# Patient Record
Sex: FEMALE | Race: WHITE | HISPANIC OR LATINO | Employment: FULL TIME | URBAN - METROPOLITAN AREA
[De-identification: names, ages, dates, MRNs, and addresses within clinical notes are randomized per-mention and may not be internally consistent; named-entity substitution may affect disease eponyms.]

---

## 2017-10-13 ENCOUNTER — TRANSFERRED RECORDS (OUTPATIENT)
Dept: HEALTH INFORMATION MANAGEMENT | Facility: CLINIC | Age: 47
End: 2017-10-13

## 2017-10-27 ENCOUNTER — TRANSFERRED RECORDS (OUTPATIENT)
Dept: HEALTH INFORMATION MANAGEMENT | Facility: CLINIC | Age: 47
End: 2017-10-27

## 2017-11-01 ENCOUNTER — TRANSFERRED RECORDS (OUTPATIENT)
Dept: HEALTH INFORMATION MANAGEMENT | Facility: CLINIC | Age: 47
End: 2017-11-01

## 2017-11-10 ENCOUNTER — TRANSFERRED RECORDS (OUTPATIENT)
Dept: HEALTH INFORMATION MANAGEMENT | Facility: CLINIC | Age: 47
End: 2017-11-10

## 2017-11-15 ENCOUNTER — TRANSFERRED RECORDS (OUTPATIENT)
Dept: HEALTH INFORMATION MANAGEMENT | Facility: CLINIC | Age: 47
End: 2017-11-15

## 2017-11-17 ENCOUNTER — SURGERY (OUTPATIENT)
Age: 47
End: 2017-11-17

## 2017-11-17 ENCOUNTER — HOSPITAL ENCOUNTER (OUTPATIENT)
Facility: CLINIC | Age: 47
Discharge: HOME | End: 2017-11-17
Attending: OBSTETRICS & GYNECOLOGY | Admitting: OBSTETRICS & GYNECOLOGY
Payer: COMMERCIAL

## 2017-11-17 ENCOUNTER — ANESTHESIA EVENT (OUTPATIENT)
Dept: SURGERY | Facility: CLINIC | Age: 47
End: 2017-11-17
Payer: COMMERCIAL

## 2017-11-17 ENCOUNTER — ANESTHESIA (OUTPATIENT)
Dept: SURGERY | Facility: CLINIC | Age: 47
End: 2017-11-17
Payer: COMMERCIAL

## 2017-11-17 VITALS
OXYGEN SATURATION: 96 % | WEIGHT: 207.5 LBS | TEMPERATURE: 97.5 F | SYSTOLIC BLOOD PRESSURE: 118 MMHG | HEIGHT: 60 IN | DIASTOLIC BLOOD PRESSURE: 74 MMHG | BODY MASS INDEX: 40.74 KG/M2 | RESPIRATION RATE: 16 BRPM

## 2017-11-17 DIAGNOSIS — G89.18 ACUTE POST-OPERATIVE PAIN: Primary | ICD-10-CM

## 2017-11-17 LAB
B-HCG SERPL-ACNC: <1 IU/L (ref 0–5)
HGB BLD-MCNC: 12.2 G/DL (ref 11.7–15.7)

## 2017-11-17 PROCEDURE — 27210794 ZZH OR GENERAL SUPPLY STERILE: Performed by: OBSTETRICS & GYNECOLOGY

## 2017-11-17 PROCEDURE — 37000008 ZZH ANESTHESIA TECHNICAL FEE, 1ST 30 MIN: Performed by: OBSTETRICS & GYNECOLOGY

## 2017-11-17 PROCEDURE — 84702 CHORIONIC GONADOTROPIN TEST: CPT | Performed by: PHYSICIAN ASSISTANT

## 2017-11-17 PROCEDURE — 36000058 ZZH SURGERY LEVEL 3 EA 15 ADDTL MIN: Performed by: OBSTETRICS & GYNECOLOGY

## 2017-11-17 PROCEDURE — 37000009 ZZH ANESTHESIA TECHNICAL FEE, EACH ADDTL 15 MIN: Performed by: OBSTETRICS & GYNECOLOGY

## 2017-11-17 PROCEDURE — 71000027 ZZH RECOVERY PHASE 2 EACH 15 MINS: Performed by: OBSTETRICS & GYNECOLOGY

## 2017-11-17 PROCEDURE — 36415 COLL VENOUS BLD VENIPUNCTURE: CPT | Performed by: PHYSICIAN ASSISTANT

## 2017-11-17 PROCEDURE — 27210995 ZZH RX 272: Performed by: OBSTETRICS & GYNECOLOGY

## 2017-11-17 PROCEDURE — 71000012 ZZH RECOVERY PHASE 1 LEVEL 1 FIRST HR: Performed by: OBSTETRICS & GYNECOLOGY

## 2017-11-17 PROCEDURE — 71000013 ZZH RECOVERY PHASE 1 LEVEL 1 EA ADDTL HR: Performed by: OBSTETRICS & GYNECOLOGY

## 2017-11-17 PROCEDURE — 85018 HEMOGLOBIN: CPT | Performed by: PHYSICIAN ASSISTANT

## 2017-11-17 PROCEDURE — 25000128 H RX IP 250 OP 636: Performed by: NURSE ANESTHETIST, CERTIFIED REGISTERED

## 2017-11-17 PROCEDURE — 25800025 ZZH RX 258: Performed by: OBSTETRICS & GYNECOLOGY

## 2017-11-17 PROCEDURE — 36000056 ZZH SURGERY LEVEL 3 1ST 30 MIN: Performed by: OBSTETRICS & GYNECOLOGY

## 2017-11-17 PROCEDURE — 40000170 ZZH STATISTIC PRE-PROCEDURE ASSESSMENT II: Performed by: OBSTETRICS & GYNECOLOGY

## 2017-11-17 PROCEDURE — 88305 TISSUE EXAM BY PATHOLOGIST: CPT | Performed by: OBSTETRICS & GYNECOLOGY

## 2017-11-17 PROCEDURE — 88305 TISSUE EXAM BY PATHOLOGIST: CPT | Mod: 26 | Performed by: OBSTETRICS & GYNECOLOGY

## 2017-11-17 PROCEDURE — 25000125 ZZHC RX 250: Performed by: NURSE ANESTHETIST, CERTIFIED REGISTERED

## 2017-11-17 PROCEDURE — 25000566 ZZH SEVOFLURANE, EA 15 MIN: Performed by: OBSTETRICS & GYNECOLOGY

## 2017-11-17 RX ORDER — FENTANYL CITRATE 50 UG/ML
25-50 INJECTION, SOLUTION INTRAMUSCULAR; INTRAVENOUS EVERY 5 MIN PRN
Status: DISCONTINUED | OUTPATIENT
Start: 2017-11-17 | End: 2017-11-17 | Stop reason: HOSPADM

## 2017-11-17 RX ORDER — FENTANYL CITRATE 50 UG/ML
INJECTION, SOLUTION INTRAMUSCULAR; INTRAVENOUS PRN
Status: DISCONTINUED | OUTPATIENT
Start: 2017-11-17 | End: 2017-11-17

## 2017-11-17 RX ORDER — PHYSOSTIGMINE SALICYLATE 1 MG/ML
1.2 INJECTION INTRAVENOUS
Status: DISCONTINUED | OUTPATIENT
Start: 2017-11-17 | End: 2017-11-17 | Stop reason: HOSPADM

## 2017-11-17 RX ORDER — IBUPROFEN 600 MG/1
600 TABLET, FILM COATED ORAL
Status: DISCONTINUED | OUTPATIENT
Start: 2017-11-17 | End: 2017-11-17 | Stop reason: HOSPADM

## 2017-11-17 RX ORDER — FENTANYL CITRATE 50 UG/ML
25-50 INJECTION, SOLUTION INTRAMUSCULAR; INTRAVENOUS
Status: DISCONTINUED | OUTPATIENT
Start: 2017-11-17 | End: 2017-11-17 | Stop reason: HOSPADM

## 2017-11-17 RX ORDER — SODIUM CHLORIDE, SODIUM LACTATE, POTASSIUM CHLORIDE, CALCIUM CHLORIDE 600; 310; 30; 20 MG/100ML; MG/100ML; MG/100ML; MG/100ML
INJECTION, SOLUTION INTRAVENOUS CONTINUOUS PRN
Status: DISCONTINUED | OUTPATIENT
Start: 2017-11-17 | End: 2017-11-17

## 2017-11-17 RX ORDER — OXYCODONE HYDROCHLORIDE 5 MG/1
5 TABLET ORAL
Status: DISCONTINUED | OUTPATIENT
Start: 2017-11-17 | End: 2017-11-17 | Stop reason: HOSPADM

## 2017-11-17 RX ORDER — MAGNESIUM HYDROXIDE 1200 MG/15ML
LIQUID ORAL PRN
Status: DISCONTINUED | OUTPATIENT
Start: 2017-11-17 | End: 2017-11-17 | Stop reason: HOSPADM

## 2017-11-17 RX ORDER — LIDOCAINE HYDROCHLORIDE 20 MG/ML
INJECTION, SOLUTION INFILTRATION; PERINEURAL PRN
Status: DISCONTINUED | OUTPATIENT
Start: 2017-11-17 | End: 2017-11-17

## 2017-11-17 RX ORDER — OXYCODONE HYDROCHLORIDE 5 MG/1
5-10 TABLET ORAL
Qty: 12 TABLET | Refills: 0 | Status: SHIPPED | OUTPATIENT
Start: 2017-11-17 | End: 2024-06-20

## 2017-11-17 RX ORDER — DEXAMETHASONE SODIUM PHOSPHATE 4 MG/ML
INJECTION, SOLUTION INTRA-ARTICULAR; INTRALESIONAL; INTRAMUSCULAR; INTRAVENOUS; SOFT TISSUE PRN
Status: DISCONTINUED | OUTPATIENT
Start: 2017-11-17 | End: 2017-11-17

## 2017-11-17 RX ORDER — ONDANSETRON 4 MG/1
4 TABLET, ORALLY DISINTEGRATING ORAL EVERY 30 MIN PRN
Status: DISCONTINUED | OUTPATIENT
Start: 2017-11-17 | End: 2017-11-17 | Stop reason: HOSPADM

## 2017-11-17 RX ORDER — ONDANSETRON 2 MG/ML
4 INJECTION INTRAMUSCULAR; INTRAVENOUS EVERY 30 MIN PRN
Status: DISCONTINUED | OUTPATIENT
Start: 2017-11-17 | End: 2017-11-17 | Stop reason: HOSPADM

## 2017-11-17 RX ORDER — NALOXONE HYDROCHLORIDE 0.4 MG/ML
.1-.4 INJECTION, SOLUTION INTRAMUSCULAR; INTRAVENOUS; SUBCUTANEOUS
Status: DISCONTINUED | OUTPATIENT
Start: 2017-11-17 | End: 2017-11-17 | Stop reason: HOSPADM

## 2017-11-17 RX ORDER — PROPOFOL 10 MG/ML
INJECTION, EMULSION INTRAVENOUS CONTINUOUS PRN
Status: DISCONTINUED | OUTPATIENT
Start: 2017-11-17 | End: 2017-11-17

## 2017-11-17 RX ORDER — ONDANSETRON 2 MG/ML
INJECTION INTRAMUSCULAR; INTRAVENOUS PRN
Status: DISCONTINUED | OUTPATIENT
Start: 2017-11-17 | End: 2017-11-17

## 2017-11-17 RX ORDER — MEPERIDINE HYDROCHLORIDE 25 MG/ML
12.5 INJECTION INTRAMUSCULAR; INTRAVENOUS; SUBCUTANEOUS
Status: DISCONTINUED | OUTPATIENT
Start: 2017-11-17 | End: 2017-11-17 | Stop reason: HOSPADM

## 2017-11-17 RX ORDER — HYDROMORPHONE HYDROCHLORIDE 1 MG/ML
.3-.5 INJECTION, SOLUTION INTRAMUSCULAR; INTRAVENOUS; SUBCUTANEOUS EVERY 10 MIN PRN
Status: DISCONTINUED | OUTPATIENT
Start: 2017-11-17 | End: 2017-11-17 | Stop reason: HOSPADM

## 2017-11-17 RX ORDER — PROPOFOL 10 MG/ML
INJECTION, EMULSION INTRAVENOUS PRN
Status: DISCONTINUED | OUTPATIENT
Start: 2017-11-17 | End: 2017-11-17

## 2017-11-17 RX ORDER — SODIUM CHLORIDE, SODIUM LACTATE, POTASSIUM CHLORIDE, CALCIUM CHLORIDE 600; 310; 30; 20 MG/100ML; MG/100ML; MG/100ML; MG/100ML
INJECTION, SOLUTION INTRAVENOUS CONTINUOUS
Status: DISCONTINUED | OUTPATIENT
Start: 2017-11-17 | End: 2017-11-17 | Stop reason: HOSPADM

## 2017-11-17 RX ORDER — KETOROLAC TROMETHAMINE 30 MG/ML
INJECTION, SOLUTION INTRAMUSCULAR; INTRAVENOUS PRN
Status: DISCONTINUED | OUTPATIENT
Start: 2017-11-17 | End: 2017-11-17

## 2017-11-17 RX ORDER — IBUPROFEN 600 MG/1
600 TABLET, FILM COATED ORAL EVERY 6 HOURS PRN
Qty: 30 TABLET | Refills: 0 | Status: SHIPPED | OUTPATIENT
Start: 2017-11-17 | End: 2024-06-20

## 2017-11-17 RX ADMIN — DEXAMETHASONE SODIUM PHOSPHATE 4 MG: 4 INJECTION, SOLUTION INTRA-ARTICULAR; INTRALESIONAL; INTRAMUSCULAR; INTRAVENOUS; SOFT TISSUE at 10:01

## 2017-11-17 RX ADMIN — ONDANSETRON 4 MG: 2 INJECTION INTRAMUSCULAR; INTRAVENOUS at 10:01

## 2017-11-17 RX ADMIN — MIDAZOLAM HYDROCHLORIDE 2 MG: 1 INJECTION, SOLUTION INTRAMUSCULAR; INTRAVENOUS at 09:49

## 2017-11-17 RX ADMIN — LIDOCAINE HYDROCHLORIDE 60 MG: 20 INJECTION, SOLUTION INFILTRATION; PERINEURAL at 09:49

## 2017-11-17 RX ADMIN — SODIUM CHLORIDE 1000 ML: 0.9 IRRIGANT IRRIGATION at 10:24

## 2017-11-17 RX ADMIN — DEXMEDETOMIDINE HYDROCHLORIDE 8 MCG: 100 INJECTION, SOLUTION INTRAVENOUS at 09:57

## 2017-11-17 RX ADMIN — PROPOFOL 180 MCG/KG/MIN: 10 INJECTION, EMULSION INTRAVENOUS at 09:49

## 2017-11-17 RX ADMIN — KETOROLAC TROMETHAMINE 30 MG: 30 INJECTION, SOLUTION INTRAMUSCULAR at 10:14

## 2017-11-17 RX ADMIN — PHENYLEPHRINE HYDROCHLORIDE 100 MCG: 10 INJECTION INTRAVENOUS at 10:04

## 2017-11-17 RX ADMIN — FENTANYL CITRATE 50 MCG: 50 INJECTION, SOLUTION INTRAMUSCULAR; INTRAVENOUS at 09:49

## 2017-11-17 RX ADMIN — SODIUM CHLORIDE 861 ML: 900 IRRIGANT IRRIGATION at 10:24

## 2017-11-17 RX ADMIN — PROPOFOL 200 MG: 10 INJECTION, EMULSION INTRAVENOUS at 09:49

## 2017-11-17 RX ADMIN — SODIUM CHLORIDE, POTASSIUM CHLORIDE, SODIUM LACTATE AND CALCIUM CHLORIDE: 600; 310; 30; 20 INJECTION, SOLUTION INTRAVENOUS at 09:49

## 2017-11-17 NOTE — IP AVS SNAPSHOT
[facility]    [address]    [place] [address]    Phone:  [phone]    Fax:  [phone]                                       After Visit Summary   11/17/2017    Linda Selby    MRN: [id number]           After Visit Summary Signature Page     I have received my discharge instructions, and my questions have been answered. I have discussed any challenges I see with this plan with the nurse or doctor.    ..........................................................................................................................................  Patient/Patient Representative Signature      ..........................................................................................................................................  Patient Representative Print Name and Relationship to Patient    ..................................................               ................................................  Date                                            Time    ..........................................................................................................................................  Reviewed by Signature/Title    ...................................................              ..............................................  Date                                                            Time

## 2017-11-17 NOTE — ANESTHESIA POSTPROCEDURE EVALUATION
Patient: Linda Selby    Procedure(s):  DILATION AND CURETTAGE, OPERATIVE HYSTEROSCOPY WITH MYOSURE MORCELLATOR  - Wound Class: II-Clean Contaminated    Diagnosis:COMPLEX FLUID AND ABNORMAL BLEEDING  Diagnosis Additional Information: No value filed.    Anesthesia Type:  General    Note:  Anesthesia Post Evaluation    Patient location during evaluation: PACU  Patient participation: Able to fully participate in evaluation  Level of consciousness: awake  Pain management: adequate  Airway patency: patent  Cardiovascular status: acceptable  Respiratory status: acceptable  Hydration status: acceptable  PONV: controlled     Anesthetic complications: None          Last vitals:  Vitals:    11/17/17 1027 11/17/17 1030 11/17/17 1045   BP: 110/65 110/65 107/62   Resp: 16 16 15   Temp: 34.8  C (94.6  F)  36.1  C (97  F)   SpO2: 97% 97% 99%         Electronically Signed By: Jason West MD  November 17, 2017  10:57 AM

## 2017-11-17 NOTE — DISCHARGE INSTRUCTIONS
Same Day Surgery Discharge Instructions for  Sedation and General Anesthesia       It's not unusual to feel dizzy, light-headed or faint for up to 24 hours after surgery or while taking pain medication.  If you have these symptoms: sit for a few minutes before standing and have someone assist you when you get up to walk or use the bathroom.      You should rest and relax for the next 24 hours. We recommend you make arrangements to have an adult stay with you for at least 24 hours after your discharge.  Avoid hazardous and strenuous activity.      DO NOT DRIVE any vehicle or operate mechanical equipment for 24 hours following the end of your surgery.  Even though you may feel normal, your reactions may be affected by the medication you have received.      Do not drink alcoholic beverages for 24 hours following surgery.       Slowly progress to your regular diet as you feel able. It's not unusual to feel nauseated and/or vomit after receiving anesthesia.  If you develop these symptoms, drink clear liquids (apple juice, ginger ale, broth, 7-up, etc. ) until you feel better.  If your nausea and vomiting persists for 24 hours, please notify your surgeon.        All narcotic pain medications, along with inactivity and anesthesia, can cause constipation. Drinking plenty of liquids and increasing fiber intake will help.      For any questions of a medical nature, call your surgeon.      Do not make important decisions for 24 hours.      If you had general anesthesia, you may have a sore throat for a couple of days related to the breathing tube used during surgery.  You may use Cepacol lozenges to help with this discomfort.  If it worsens or if you develop a fever, contact your surgeon.       If you feel your pain is not well managed with the pain medications prescribed by your surgeon, please contact your surgeon's office to let them know so they can address your concerns.       HOME CARE FOLLOWING DILATION AND CURETTAGE  (D&C)    Diet  You have no restrictions on your diet.  During the evening following surgery, drink plenty of fluids and eat a light supper.    Nausea  The anesthesia may produce some nausea.  If you feel nauseated, stay in bed and try drinking fluids such as 7-Up, tea, or soup.    Discomfort  The amount of discomfort you can expect is very unpredictable.  If you have pain that cannot be controlled with Tylenol or with the prescription you may have received, you should notify your physician.  Abdominal cramping or low backache is not uncommon and should not be a cause for concern.  You will be drowsy and weak the day of surgery and possibly the following day.  Fever  A low grade fever (not over 100 F) is usual after this procedure.  Do not hesitate to notify your physician if your fever seems excessive or persists.    Activity   Rest on the day of surgery then you may resume your normal activity, as tolerated  Avoid heavy lifting for one week.    You may bathe or shower.  Do not douche, use tampons, or resume intercourse until the bleeding has ceased.    Emergency Care  Contact your physician if you have any of these problems:   1.  A fever over 100 F   2.  A large amount of bleeding or drainage   3.  Severe pain    HOME CARE FOLLOWING HYSTEROSCOPY    Diet  You have no restrictions on your diet.  During the evening following surgery, drink plenty of fluids and eat a light supper.    Nausea  The anesthesia may produce some nausea.  If you feel nauseated, stay in bed and try drinking fluids such as 7-Up, tea, or soup.    Discomfort  The amount of discomfort you can expect is very unpredictable.  If you have pain that cannot be controlled with Tylenol or with the prescription you may have received, you should notify your physician.  Abdominal cramping or low backache is not uncommon and should not be a cause for concern.  You will be drowsy and weak the day of surgery and possibly the following day.  Fever  A low grade  fever (not over 100 F) is usual after this procedure.  Do not hesitate to notify your physician if your fever seems excessive or persists.    Activity   You may resume your normal activity.  Avoid heavy lifting for one week.  You may bathe or shower.  Do not douche, use tampons, or resume intercourse until the bleeding has ceased.    Emergency Care  Contact your physician if you have any of these problems:   1.  A fever over 100 F   2.  A large amount of bleeding or drainage   3.  Severe pain             .  .  **If you have questions or concerns about your procedure,   call Dr. Owens at [phone]**    Per Dr. Owens please given her a call at the clinic this afternoon to discuss the results of your surgery today.            While you were at the hospital today you received Toradol, an antiinflammatory medication similar to Ibuprofen.  You should not take other antiinflammatory medication, such as Ibuprofen, Motrin, Advil, Aleve, Naprosyn, etc, until 4:15pm.

## 2017-11-17 NOTE — ANESTHESIA PREPROCEDURE EVALUATION
Anesthesia Evaluation     . Pt has had prior anesthetic.     No history of anesthetic complications          ROS/MED HX    ENT/Pulmonary:       Neurologic:     (+)migraines,     Cardiovascular:         METS/Exercise Tolerance:     Hematologic:         Musculoskeletal:         GI/Hepatic:     (+) GERD Asymptomatic on medication,       Renal/Genitourinary:         Endo:     (+) Obesity (BMI 41), .      Psychiatric:         Infectious Disease:         Malignancy:         Other:                     Physical Exam  Normal systems: cardiovascular and pulmonary    Airway   Mallampati: III  TM distance: >3 FB  Neck ROM: full    Dental     Cardiovascular       Pulmonary                     Anesthesia Plan      History & Physical Review  History and physical reviewed and following examination; no interval change.    ASA Status:  3 .    NPO Status:  > 8 hours    Plan for General and ETT with Intravenous and Propofol induction. Maintenance will be TIVA.      LMA with gastric port  Tordal if OK with anesthesia      Postoperative Care  Postoperative pain management:  IV analgesics and Oral pain medications.      Consents  Anesthetic plan, risks, benefits and alternatives discussed with:  Patient..                          .  DPreop diagnosis: COMPLEX FLUID AND ABNORMAL BLEEDING  Procedure(s):  COMBINED DILATION AND CURETTAGE, OPERATIVE HYSTEROSCOPY WITH MORCELLATOR  Allergies   Allergen Reactions     Hydromorphone      Altered heart rate     Oxycodone Hcl Nausea       No current facility-administered medications on file prior to encounter.   Current Outpatient Prescriptions on File Prior to Encounter:  TAMOXIFEN CITRATE PO Take 20 mg by mouth daily   OMEPRAZOLE PO Take 20 mg by mouth every morning

## 2017-11-17 NOTE — OP NOTE
Linda Selby  1970 11/17/2017      Preop Dx: 1. Complex fluid in the endometrium  2. Endometrial polyps    Postop Dx:  Same                          Procedure:  Dilatation, Curettage, Hysteroscopy, myosure    Anesthesia:  General    Surgeon:  Tonie Albrecht MD    Procedure:  The patient was brought to the operating room where following general anesthesia was placed in the dorsolithotomy position where she was prepped and draped.  Her bladder was emptied of clear urine.      A speculum was placed.  The cervix was grasped with a tenaculum.  Once cervix dilated complex fluid started to evacuate and that fluid was sampled.  The cervix was dilated to accommodate the hysteroscope.  Under direct visualization the hysteroscope was placed into the endometrial cavity with the findings of endometrial polyps.  Myosure introduced and specimen removed.    The endometrial cavity was curetted. The specimens were submitted to pathology for microscopic analysis.    The instruments were removed from the vagina and all areas were hemostatic.  The estimated blood loss was 15 cc.  All sponge, needle, and instrument counts were correct.  The patient was awakened and removed to the recovery room in stable condition.    Ambrocio Mccann  November 17, 2017

## 2017-11-17 NOTE — ANESTHESIA CARE TRANSFER NOTE
Patient: Linda Selby    Procedure(s):  DILATION AND CURETTAGE, OPERATIVE HYSTEROSCOPY WITH MYOSURE MORCELLATOR  - Wound Class: II-Clean Contaminated    Diagnosis: COMPLEX FLUID AND ABNORMAL BLEEDING  Diagnosis Additional Information: No value filed.    Anesthesia Type:   General     Note:  Anesthesia Care Transfer Notewriter    Vitals: (Last set prior to Anesthesia Care Transfer)    CRNA VITALS  11/17/2017 0955 - 11/17/2017 1042      11/17/2017             NIBP: 117/74    Pulse: 62    NIBP Mean: 87    SpO2: 93 %    Resp Rate (observed): (!)  41                Electronically Signed By: ATIYA Peñaloza CRNA  November 17, 2017  10:42 AM

## 2017-11-19 ENCOUNTER — HEALTH MAINTENANCE LETTER (OUTPATIENT)
Age: 47
End: 2017-11-19

## 2017-11-21 LAB — COPATH REPORT: NORMAL

## 2018-08-14 ENCOUNTER — APPOINTMENT (OUTPATIENT)
Dept: GENERAL RADIOLOGY | Facility: CLINIC | Age: 48
End: 2018-08-14
Attending: PHYSICIAN ASSISTANT
Payer: COMMERCIAL

## 2018-08-14 ENCOUNTER — HOSPITAL ENCOUNTER (EMERGENCY)
Facility: CLINIC | Age: 48
Discharge: HOME | End: 2018-08-14
Attending: EMERGENCY MEDICINE | Admitting: EMERGENCY MEDICINE
Payer: COMMERCIAL

## 2018-08-14 VITALS
SYSTOLIC BLOOD PRESSURE: 117 MMHG | BODY MASS INDEX: 40.84 KG/M2 | WEIGHT: 208 LBS | RESPIRATION RATE: 16 BRPM | HEIGHT: 60 IN | OXYGEN SATURATION: 98 % | DIASTOLIC BLOOD PRESSURE: 80 MMHG | HEART RATE: 80 BPM

## 2018-08-14 DIAGNOSIS — R07.9 ACUTE CHEST PAIN: ICD-10-CM

## 2018-08-14 LAB
ANION GAP SERPL CALCULATED.3IONS-SCNC: 8 MMOL/L (ref 3–14)
BASOPHILS # BLD AUTO: 0 10E9/L (ref 0–0.2)
BASOPHILS NFR BLD AUTO: 0.2 %
BUN SERPL-MCNC: 11 MG/DL (ref 7–30)
CALCIUM SERPL-MCNC: 8.7 MG/DL (ref 8.5–10.1)
CHLORIDE SERPL-SCNC: 106 MMOL/L (ref 94–109)
CO2 SERPL-SCNC: 26 MMOL/L (ref 20–32)
CREAT SERPL-MCNC: 0.61 MG/DL (ref 0.52–1.04)
D DIMER PPP FEU-MCNC: 0.5 UG/ML FEU (ref 0–0.5)
DIFFERENTIAL METHOD BLD: ABNORMAL
EOSINOPHIL # BLD AUTO: 0.2 10E9/L (ref 0–0.7)
EOSINOPHIL NFR BLD AUTO: 2.6 %
ERYTHROCYTE [DISTWIDTH] IN BLOOD BY AUTOMATED COUNT: 12.6 % (ref 10–15)
GFR SERPL CREATININE-BSD FRML MDRD: >90 ML/MIN/1.7M2
GLUCOSE SERPL-MCNC: 112 MG/DL (ref 70–99)
HCT VFR BLD AUTO: 34.7 % (ref 35–47)
HGB BLD-MCNC: 12.1 G/DL (ref 11.7–15.7)
IMM GRANULOCYTES # BLD: 0 10E9/L (ref 0–0.4)
IMM GRANULOCYTES NFR BLD: 0.2 %
INTERPRETATION ECG - MUSE: NORMAL
LYMPHOCYTES # BLD AUTO: 2 10E9/L (ref 0.8–5.3)
LYMPHOCYTES NFR BLD AUTO: 34.9 %
MCH RBC QN AUTO: 28.7 PG (ref 26.5–33)
MCHC RBC AUTO-ENTMCNC: 34.9 G/DL (ref 31.5–36.5)
MCV RBC AUTO: 82 FL (ref 78–100)
MONOCYTES # BLD AUTO: 0.2 10E9/L (ref 0–1.3)
MONOCYTES NFR BLD AUTO: 4 %
NEUTROPHILS # BLD AUTO: 3.4 10E9/L (ref 1.6–8.3)
NEUTROPHILS NFR BLD AUTO: 58.1 %
NRBC # BLD AUTO: 0 10*3/UL
NRBC BLD AUTO-RTO: 0 /100
PLATELET # BLD AUTO: 283 10E9/L (ref 150–450)
POTASSIUM SERPL-SCNC: 3.3 MMOL/L (ref 3.4–5.3)
RBC # BLD AUTO: 4.22 10E12/L (ref 3.8–5.2)
SODIUM SERPL-SCNC: 140 MMOL/L (ref 133–144)
TROPONIN I SERPL-MCNC: <0.015 UG/L (ref 0–0.04)
WBC # BLD AUTO: 5.8 10E9/L (ref 4–11)

## 2018-08-14 PROCEDURE — 93005 ELECTROCARDIOGRAM TRACING: CPT

## 2018-08-14 PROCEDURE — 25000132 ZZH RX MED GY IP 250 OP 250 PS 637: Performed by: PHYSICIAN ASSISTANT

## 2018-08-14 PROCEDURE — 85379 FIBRIN DEGRADATION QUANT: CPT | Performed by: PHYSICIAN ASSISTANT

## 2018-08-14 PROCEDURE — 99285 EMERGENCY DEPT VISIT HI MDM: CPT

## 2018-08-14 PROCEDURE — 85025 COMPLETE CBC W/AUTO DIFF WBC: CPT | Performed by: PHYSICIAN ASSISTANT

## 2018-08-14 PROCEDURE — 71046 X-RAY EXAM CHEST 2 VIEWS: CPT

## 2018-08-14 PROCEDURE — 80048 BASIC METABOLIC PNL TOTAL CA: CPT | Performed by: PHYSICIAN ASSISTANT

## 2018-08-14 PROCEDURE — 84484 ASSAY OF TROPONIN QUANT: CPT | Performed by: PHYSICIAN ASSISTANT

## 2018-08-14 RX ORDER — ASPIRIN 81 MG/1
324 TABLET, CHEWABLE ORAL ONCE
Status: COMPLETED | OUTPATIENT
Start: 2018-08-14 | End: 2018-08-14

## 2018-08-14 RX ADMIN — ASPIRIN 81 MG 324 MG: 81 TABLET ORAL at 15:51

## 2018-08-14 ASSESSMENT — ENCOUNTER SYMPTOMS
WHEEZING: 0
VOMITING: 0
BACK PAIN: 1
FEVER: 0
NAUSEA: 0
COUGH: 0
SHORTNESS OF BREATH: 0
PALPITATIONS: 0

## 2018-08-14 NOTE — ED NOTES
Pt here with concerns for a blood clot due to chest pressure. Pain started yesterday and she feels it in her back when she tries to breath deeply.

## 2018-08-14 NOTE — ED AVS SNAPSHOT
Emergency Department    [address] [facility] [address]    Phone:  [phone]    Fax:  [phone]                                       Linda Selby   MRN: [id number]    Department:   Emergency Department   Date of Visit:  8/14/2018           After Visit Summary Signature Page     I have received my discharge instructions, and my questions have been answered. I have discussed any challenges I see with this plan with the nurse or doctor.    ..........................................................................................................................................  Patient/Patient Representative Signature      ..........................................................................................................................................  Patient Representative Print Name and Relationship to Patient    ..................................................               ................................................  Date                                            Time    ..........................................................................................................................................  Reviewed by Signature/Title    ...................................................              ..............................................  Date                                                            Time

## 2018-08-14 NOTE — ED AVS SNAPSHOT
Emergency Department    [address] [facility] [address]    Phone:  [phone]    Fax:  [phone]                                       Linda Selby   MRN: [id number]    Department:   Emergency Department   Date of Visit:  8/14/2018           Patient Information     Date Of Birth          1970        Your diagnoses for this visit were:     Acute chest pain        You were seen by Glynn Fall MD.      Follow-up Information     Follow up with  Emergency Department.    Specialty:  EMERGENCY MEDICINE    Why:  As needed, If symptoms worsen    Contact information:    [address] [facility] [id number] [phone]        Follow up with Daniel Ramírez MD.    Specialty:  Family Practice    Why:  If symptoms persist    Contact information:    [address] [address]  [phone]          Discharge Instructions       Discharge Instructions  Chest Pain    You have been seen today for chest pain or discomfort.  At this time, your provider has found no signs that your chest pain is due to a serious or life-threatening condition, (or you have declined more testing and/or admission to the hospital). However, sometimes there is a serious problem that does not show up right away. Your evaluation today may not be complete and you may need further testing and evaluation.     Generally, every Emergency Department visit should have a follow-up clinic visit with either a primary or a specialty clinic/provider. Please follow-up as instructed by your emergency provider today.  Return to the Emergency Department if:    Your chest pain changes, gets worse, starts to happen more often, or comes with less activity.    You are newly short of breath.    You get very weak or tired.    You pass out or faint.    You have any new symptoms, like fever, cough, numb legs, or you cough up blood.    You have anything else that worries  you.    Until you follow-up with your regular provider, please do the following:    Take one aspirin daily unless you have an allergy or are told not to by your provider.    If a stress test appointment has been made, go to the appointment.    If you have questions, contact your regular provider.    Follow-up with your regular provider/clinic as directed; this is very important.    If you were given a prescription for medicine here today, be sure to read all of the information (including the package insert) that comes with your prescription.  This will include important information about the medicine, its side effects, and any warnings that you need to know about.  The pharmacist who fills the prescription can provide more information and answer questions you may have about the medicine.  If you have questions or concerns that the pharmacist cannot address, please call or return to the Emergency Department.       Remember that you can always come back to the Emergency Department if you are not able to see your regular provider in the amount of time listed above, if you get any new symptoms, or if there is anything that worries you.      24 Hour Appointment Hotline       To make an appointment at any [facility], call [phone] ([phone]). If you don't have a family doctor or clinic, we will help you find one. [facility] are conveniently located to serve the needs of you and your family.             Review of your medicines      Our records show that you are taking the medicines listed below. If these are incorrect, please call your family doctor or clinic.        Dose / Directions Last dose taken    ibuprofen 600 MG tablet   Commonly known as:  ADVIL/MOTRIN   Dose:  600 mg   Quantity:  30 tablet        Take 1 tablet (600 mg) by mouth every 6 hours as needed for pain (mild)   Refills:  0        OMEPRAZOLE PO   Dose:  20 mg        Take 20 mg by mouth every morning   Refills:  0        oxyCODONE IR  5 MG tablet   Commonly known as:  ROXICODONE   Dose:  5-10 mg   Quantity:  12 tablet        Take 1-2 tablets (5-10 mg) by mouth every 3 hours as needed for pain or other (Moderate to Severe)   Refills:  0                Procedures and tests performed during your visit     Basic metabolic panel    CBC with platelets differential    D dimer quantitative    EKG 12 lead    Troponin I    XR Chest 2 Views      Orders Needing Specimen Collection     None      Pending Results     No orders found from 8/12/2018 to 8/15/2018.            Pending Culture Results     No orders found from 8/12/2018 to 8/15/2018.            Pending Results Instructions     If you had any lab results that were not finalized at the time of your Discharge, you can call the ED Lab Result RN at [phone]. You will be contacted by this team for any positive Lab results or changes in treatment. The nurses are available 7 days a week from 10A to 6:30P.  You can leave a message 24 hours per day and they will return your call.        Test Results From Your Hospital Stay        8/14/2018  3:42 PM      Component Results     Component Value Ref Range & Units Status    WBC 5.8 4.0 - 11.0 10e9/L Final    RBC Count 4.22 3.8 - 5.2 10e12/L Final    Hemoglobin 12.1 11.7 - 15.7 g/dL Final    Hematocrit 34.7 (L) 35.0 - 47.0 % Final    MCV 82 78 - 100 fl Final    MCH 28.7 26.5 - 33.0 pg Final    MCHC 34.9 31.5 - 36.5 g/dL Final    RDW 12.6 10.0 - 15.0 % Final    Platelet Count 283 150 - 450 10e9/L Final    Diff Method Automated Method  Final    % Neutrophils 58.1 % Final    % Lymphocytes 34.9 % Final    % Monocytes 4.0 % Final    % Eosinophils 2.6 % Final    % Basophils 0.2 % Final    % Immature Granulocytes 0.2 % Final    Nucleated RBCs 0 0 /100 Final    Absolute Neutrophil 3.4 1.6 - 8.3 10e9/L Final    Absolute Lymphocytes 2.0 0.8 - 5.3 10e9/L Final    Absolute Monocytes 0.2 0.0 - 1.3 10e9/L Final    Absolute Eosinophils 0.2 0.0 - 0.7 10e9/L Final    Absolute  Basophils 0.0 0.0 - 0.2 10e9/L Final    Abs Immature Granulocytes 0.0 0 - 0.4 10e9/L Final    Absolute Nucleated RBC 0.0  Final         8/14/2018  3:55 PM      Component Results     Component Value Ref Range & Units Status    D Dimer 0.5 0.0 - 0.50 ug/ml FEU Final    This D-dimer assay is intended for use in conjunction with a clinical pretest   probability assessment model to exclude pulmonary embolism (PE) and deep   venous thrombosis (DVT) in outpatients suspected of PE or DVT. The cut-off   value is 0.5 ug/mL FEU.           8/14/2018  3:55 PM      Component Results     Component Value Ref Range & Units Status    Sodium 140 133 - 144 mmol/L Final    Potassium 3.3 (L) 3.4 - 5.3 mmol/L Final    Chloride 106 94 - 109 mmol/L Final    Carbon Dioxide 26 20 - 32 mmol/L Final    Anion Gap 8 3 - 14 mmol/L Final    Glucose 112 (H) 70 - 99 mg/dL Final    Urea Nitrogen 11 7 - 30 mg/dL Final    Creatinine 0.61 0.52 - 1.04 mg/dL Final    GFR Estimate >90 >60 mL/min/1.7m2 Final    Non  GFR Calc    GFR Estimate If Black >90 >60 mL/min/1.7m2 Final    African American GFR Calc    Calcium 8.7 8.5 - 10.1 mg/dL Final         8/14/2018  4:00 PM      Component Results     Component Value Ref Range & Units Status    Troponin I ES <0.015 0.000 - 0.045 ug/L Final    The 99th percentile for upper reference range is 0.045 ug/L.  Troponin values   in the range of 0.045 - 0.120 ug/L may be associated with risks of adverse   clinical events.           8/14/2018  5:08 PM      Narrative     CHEST TWO VIEWS  8/14/2018 4:41 PM     HISTORY: Chest pain;     COMPARISON: None.        Impression     IMPRESSION: Mild cardiomegaly. No evidence for congestive heart  failure or any infiltrates. No pleural effusions.    BRITTANY BENNETT MD                Clinical Quality Measure: Blood Pressure Screening     Your blood pressure was checked while you were in the emergency department today. The last reading we obtained was  BP: 117/80 . Please  read the guidelines below about what these numbers mean and what you should do about them.  If your systolic blood pressure (the top number) is less than 120 and your diastolic blood pressure (the bottom number) is less than 80, then your blood pressure is normal. There is nothing more that you need to do about it.  If your systolic blood pressure (the top number) is 120-139 or your diastolic blood pressure (the bottom number) is 80-89, your blood pressure may be higher than it should be. You should have your blood pressure rechecked within a year by a primary care provider.  If your systolic blood pressure (the top number) is 140 or greater or your diastolic blood pressure (the bottom number) is 90 or greater, you may have high blood pressure. High blood pressure is treatable, but if left untreated over time it can put you at risk for heart attack, stroke, or kidney failure. You should have your blood pressure rechecked by a primary care provider within the next 4 weeks.  If your provider in the emergency department today gave you specific instructions to follow-up with your doctor or provider even sooner than that, you should follow that instruction and not wait for up to 4 weeks for your follow-up visit.        Thank you for choosing [facility]       Thank you for choosing [facility] for your care. Our goal is always to provide you with excellent care. Hearing back from our patients is one way we can continue to improve our services. Please take a few minutes to complete the written survey that you may receive in the mail after you visit with us. Thank you!        [facility] Information     [facility] [employer] [facility] gives you secure access to your electronic health record. If you see a primary care provider, you can also send messages to your care team and make appointments. If you have questions, please call your primary care clinic.  If you do not have a primary care provider, please call [phone] and they will assist you.         Care EveryWhere ID     This is your Care EveryWhere ID. This could be used by other organizations to access your [facility] medical records  [id number]        Equal Access to Services     CAROLE FUNG : Anshu Isaacs, nanette brush, emili toro, lizzette munoz. So [facility] [phone].    ATENCIÓN: Si habla español, tiene a shea disposición servicios gratuitos de asistencia lingüística. Llame al [phone].    We comply with applicable federal civil rights laws and [place] laws. We do not discriminate on the basis of race, color, national origin, age, disability, sex, sexual orientation, or gender identity.            After Visit Summary       This is your record. Keep this with you and show to your community pharmacist(s) and doctor(s) at your next visit.

## 2018-08-14 NOTE — ED PROVIDER NOTES
Emergency Department Attending Supervision Note  8/14/2018  3:09 PM      I evaluated this patient in conjunction with Alan Lau PA-c       Briefly, the patient presented with chest pain since 1pm yesterday associated with pain in the left upper back that is pleuritic. Denies fever, cough, or injury.  She denies palpitations, syncope, lower extremity swelling or peripheral edema.  No significant shortness of breath.    Patient has prior history of PE 3-4 years ago and is not anticoagulated at this time. Denies leg swelling or pain.     On my exam, patient is well-appearing alert oriented appropriate.  Heart sounds are regular rate and rhythm no murmurs rubs or gallops.  Radial pulses 2+ and symmetric bilaterally.  Lungs are clear and equal to auscultation bilaterally.  Abdomen is soft nontender nondistended.  Patient exhibits tenderness to palpation of the left upper chest as well as left upper back that reproduces her symptoms.  There is no peripheral edema no calf swelling or tenderness to palpation.    Results:  All ED results are reviewed by myself.  D-dimer negative.  Troponin negative.  EKG without evidence of acute ischemic changes or dysrhythmia.  Chest x-ray without acute findings.    ED course:  The patient was seen and examined.  Broad differential diagnosis is considered including PE given her history.  Her clinical symptoms would be very atypical for cardiac ischemia and as they have been going on for several days a single troponin was obtained and was negative.  I feel that her symptoms are unlikely to represent an acute coronary syndrome.  D-dimer was negative which makes pulmonary embolism unlikely.  In addition patient has no tachycardia hypoxia or clinical signs or symptoms of DVT.  Overall I feel that her symptoms are likely related to musculoskeletal pain.  She has no other evidence to suggest other emergent etiology such as pneumonia, pneumothorax, pleural effusions.  Her symptoms may also be  due to pleurisy however I would not expect her to have tenderness on examination with this.    My impression is chest wall pain.    Diagnosis    ICD-10-CM    1. Acute chest pain R07.9          Glynn Fall MD Daro, Ryan Clay, MD  08/15/18 1323

## 2019-03-28 ENCOUNTER — TRANSFERRED RECORDS (OUTPATIENT)
Dept: HEALTH INFORMATION MANAGEMENT | Facility: CLINIC | Age: 49
End: 2019-03-28

## 2019-10-11 ENCOUNTER — TRANSFERRED RECORDS (OUTPATIENT)
Dept: HEALTH INFORMATION MANAGEMENT | Facility: CLINIC | Age: 49
End: 2019-10-11

## 2020-03-01 ENCOUNTER — HEALTH MAINTENANCE LETTER (OUTPATIENT)
Age: 50
End: 2020-03-01

## 2020-12-14 ENCOUNTER — HEALTH MAINTENANCE LETTER (OUTPATIENT)
Age: 50
End: 2020-12-14

## 2021-04-18 ENCOUNTER — HEALTH MAINTENANCE LETTER (OUTPATIENT)
Age: 51
End: 2021-04-18

## 2021-10-02 ENCOUNTER — HEALTH MAINTENANCE LETTER (OUTPATIENT)
Age: 51
End: 2021-10-02

## 2021-10-05 ENCOUNTER — LAB REQUISITION (OUTPATIENT)
Dept: LAB | Facility: CLINIC | Age: 51
End: 2021-10-05

## 2021-10-05 LAB
HBV SURFACE AB SERPL IA-ACNC: 3.78 M[IU]/ML
HBV SURFACE AG SERPL QL IA: NONREACTIVE
VZV IGG SER QL IA: >4000 INDEX
VZV IGG SER QL IA: POSITIVE

## 2021-10-05 PROCEDURE — 87340 HEPATITIS B SURFACE AG IA: CPT | Performed by: INTERNAL MEDICINE

## 2021-10-05 PROCEDURE — 86787 VARICELLA-ZOSTER ANTIBODY: CPT | Performed by: INTERNAL MEDICINE

## 2021-10-05 PROCEDURE — 86481 TB AG RESPONSE T-CELL SUSP: CPT | Performed by: INTERNAL MEDICINE

## 2021-10-05 PROCEDURE — 86706 HEP B SURFACE ANTIBODY: CPT | Performed by: INTERNAL MEDICINE

## 2021-10-06 LAB
GAMMA INTERFERON BACKGROUND BLD IA-ACNC: 0 IU/ML
M TB IFN-G BLD-IMP: NEGATIVE
M TB IFN-G CD4+ BCKGRND COR BLD-ACNC: 10 IU/ML
MITOGEN IGNF BCKGRD COR BLD-ACNC: 0.04 IU/ML
MITOGEN IGNF BCKGRD COR BLD-ACNC: 0.06 IU/ML
QUANTIFERON MITOGEN: 10 IU/ML
QUANTIFERON NIL TUBE: 0 IU/ML
QUANTIFERON TB1 TUBE: 0.06 IU/ML
QUANTIFERON TB2 TUBE: 0.04

## 2022-01-13 ENCOUNTER — LAB REQUISITION (OUTPATIENT)
Dept: LAB | Facility: CLINIC | Age: 52
End: 2022-01-13

## 2022-01-13 LAB — SARS-COV-2 RNA RESP QL NAA+PROBE: NEGATIVE

## 2022-01-13 PROCEDURE — U0005 INFEC AGEN DETEC AMPLI PROBE: HCPCS | Performed by: INTERNAL MEDICINE

## 2022-05-14 ENCOUNTER — HEALTH MAINTENANCE LETTER (OUTPATIENT)
Age: 52
End: 2022-05-14

## 2022-05-17 ENCOUNTER — LAB (OUTPATIENT)
Dept: URGENT CARE | Facility: URGENT CARE | Age: 52
End: 2022-05-17
Attending: INTERNAL MEDICINE
Payer: COMMERCIAL

## 2022-05-17 ENCOUNTER — VIRTUAL VISIT (OUTPATIENT)
Dept: FAMILY MEDICINE | Facility: CLINIC | Age: 52
End: 2022-05-17
Payer: COMMERCIAL

## 2022-05-17 DIAGNOSIS — R51.9 ACUTE NONINTRACTABLE HEADACHE, UNSPECIFIED HEADACHE TYPE: ICD-10-CM

## 2022-05-17 DIAGNOSIS — R06.09 DYSPNEA ON EXERTION: ICD-10-CM

## 2022-05-17 DIAGNOSIS — R05.9 COUGH: ICD-10-CM

## 2022-05-17 DIAGNOSIS — Z86.711 HISTORY OF PULMONARY EMBOLISM: ICD-10-CM

## 2022-05-17 DIAGNOSIS — R09.81 CONGESTION OF PARANASAL SINUS: ICD-10-CM

## 2022-05-17 DIAGNOSIS — R05.9 COUGH: Primary | ICD-10-CM

## 2022-05-17 DIAGNOSIS — Z85.3 PERSONAL HISTORY OF MALIGNANT NEOPLASM OF BREAST: ICD-10-CM

## 2022-05-17 PROCEDURE — U0003 INFECTIOUS AGENT DETECTION BY NUCLEIC ACID (DNA OR RNA); SEVERE ACUTE RESPIRATORY SYNDROME CORONAVIRUS 2 (SARS-COV-2) (CORONAVIRUS DISEASE [COVID-19]), AMPLIFIED PROBE TECHNIQUE, MAKING USE OF HIGH THROUGHPUT TECHNOLOGIES AS DESCRIBED BY CMS-2020-01-R: HCPCS

## 2022-05-17 PROCEDURE — 99214 OFFICE O/P EST MOD 30 MIN: CPT | Mod: 95 | Performed by: INTERNAL MEDICINE

## 2022-05-17 PROCEDURE — U0005 INFEC AGEN DETEC AMPLI PROBE: HCPCS

## 2022-05-17 RX ORDER — ALBUTEROL SULFATE 90 UG/1
2 AEROSOL, METERED RESPIRATORY (INHALATION) EVERY 4 HOURS PRN
Qty: 18 G | Refills: 0 | Status: SHIPPED | OUTPATIENT
Start: 2022-05-17 | End: 2024-06-20

## 2022-05-17 NOTE — PROGRESS NOTES
Linda is a 52 year old who is being evaluated via a billable video visit.      How would you like to obtain your AVS? MyChart  If the video visit is dropped, the invitation should be resent by: Text to cell phone: [phone]  Will anyone else be joining your video visit? No      Video Start Time: 10:04 AM    Assessment & Plan   Problem List Items Addressed This Visit    None     Visit Diagnoses     Cough    -  Primary    Relevant Medications    albuterol (PROAIR HFA/PROVENTIL HFA/VENTOLIN HFA) 108 (90 Base) MCG/ACT inhaler    Other Relevant Orders    Symptomatic; Yes; 5/11/2022 COVID-19 Virus (Coronavirus) by PCR    Congestion of paranasal sinus        Relevant Orders    Symptomatic; Yes; 5/11/2022 COVID-19 Virus (Coronavirus) by PCR    Acute nonintractable headache, unspecified headache type        Relevant Orders    Symptomatic; Yes; 5/11/2022 COVID-19 Virus (Coronavirus) by PCR    Personal history of malignant neoplasm of breast        History of pulmonary embolism        Dyspnea on exertion        does not think she needs an inhaler    Relevant Medications    albuterol (PROAIR HFA/PROVENTIL HFA/VENTOLIN HFA) 108 (90 Base) MCG/ACT inhaler         Discussed signs and symptoms, probably has COVID infection.  Discussed the management of upper respiratory infection including supportive measures, over-the-counter antihistamine such as Benadryl for congestion and Flonase Mucinex Mucinex DM etc.  Keep well-hydrated.  Check pulse oximetry, follow-up immediately in urgent care or ER if worsening shortness of breath difficulty breathing chest pain pleuritic chest pain pulse ox less than 94%.  Of concern patient does have history of breast cancer in 2012 at that time had radiation and has a history of pulmonary embolism.  On repeat questioning patient denies any pleuritic chest pain she describes some pressure sensation in her chest, advised her to seek immediate medical attention the ER patient did not seem to be  interested at this time.  Follow-up in 48 hours and as needed         See Patient Instructions    No follow-ups on file.    Levar Ferro MD  [facility] CAMILLE Mckeon is a 52 year old who presents for the following health issues     History of Present Illness       Reason for visit:  Covid sintoms  Symptom onset:  3-7 days ago  Symptoms include:  Cough, running nose, headache sore throat, body ache  Symptom intensity:  Moderate  Symptom progression:  Staying the same  Had these symptoms before:  Yes  Has tried/received treatment for these symptoms:  No  What makes it worse:  After physical movement  What makes it better:  No really comes and goes, like bed rest, and some cold medicine for nightShe consumes 1 sweetened beverage(s) daily.She exercises with enough effort to increase her heart rate 20 to 29 minutes per day.  She exercises with enough effort to increase her heart rate 4 days per week.        PCP from [facility] has been sick since Wednesday,   Gets short of breath, started yesterday,   When gets physical , she starts coughing    Sat, Sunday, was really, worst     clinitest, has been doing testing has been negative.    Lives with 21 yr old daughter, with cough..    Started with sore throat, then runny nose..    No fever,   Was checking fever, 97.9 F, 98F    Lot of phlegm, greenish color, has been clearing    Yesterday thought is feeling better, took nyquill, morning     Headache and cough started again yesterday    Has not checked, Pox    No asthma, no smoking    Is not fully vaccinated,           Review of Systems   Constitutional, HEENT, cardiovascular, pulmonary, gi and gu systems are negative, except as otherwise noted.      Objective           Vitals:  No vitals were obtained today due to virtual visit.    Physical Exam   GENERAL: Healthy, alert and no distress  EYES: Eyes grossly normal to inspection.  No discharge or erythema, or obvious scleral/conjunctival  abnormalities.  RESP: No audible wheeze, cough, or visible cyanosis.  No visible retractions or increased work of breathing.    SKIN: Visible skin clear. No significant rash, abnormal pigmentation or lesions.  NEURO: Cranial nerves grossly intact.  Mentation and speech appropriate for age.  PSYCH: Mentation appears normal, affect normal/bright, judgement and insight intact, normal speech and appearance well-groomed.    Lab Requisition on 01/13/2022   Component Date Value Ref Range Status     SARS CoV2 PCR 01/13/2022 Negative  Negative, Testing sent to reference lab. Results will be returned via unsolicited result Final    NEGATIVE: SARS-CoV-2 (COVID-19) RNA not detected, presumed negative.               Video-Visit Details    Type of service:  Video Visit    Video End Time:10:28 AM    Originating Location (pt. Location): Home    Distant Location (provider location):  [facility]     Platform used for Video Visit: Emu Messenger

## 2022-05-18 LAB — SARS-COV-2 RNA RESP QL NAA+PROBE: NEGATIVE

## 2022-09-03 ENCOUNTER — HEALTH MAINTENANCE LETTER (OUTPATIENT)
Age: 52
End: 2022-09-03

## 2022-09-07 ENCOUNTER — TELEPHONE (OUTPATIENT)
Dept: FAMILY MEDICINE | Facility: CLINIC | Age: 52
End: 2022-09-07

## 2022-09-07 NOTE — TELEPHONE ENCOUNTER
[facility] est care paperwork for dog for travel   Made On: 6/28/2022 3:03 PM        I see patient has an appointment for preventive visit on 9/9.    Please advise patient, I do not usually provide letter for  dog for Travel, so I will not be able to provide such letter for patient.

## 2022-09-09 ENCOUNTER — TELEPHONE (OUTPATIENT)
Dept: FAMILY MEDICINE | Facility: CLINIC | Age: 52
End: 2022-09-09

## 2022-09-09 NOTE — TELEPHONE ENCOUNTER
PCP please discuss with patient at visit, see below triage unable to get ahold of patient before appt.   9/9/2022 10:30 AM (Arrive by 10:10 AM) Levar Ferro MD [facility]       Gissel Montes De Oca RN  [facility]

## 2022-09-09 NOTE — TELEPHONE ENCOUNTER
Per chart review, patient canceled appointment today.        Gissel Montes De Oca RN  [facility]

## 2022-09-09 NOTE — TELEPHONE ENCOUNTER
Per huddle with doctor Kasie, pt needs to be informed provider can see her for preventative visit,  but will not be able to provide letter for dog travel.     Left voice message asking pt to call triage back.    Once pt is informed of providers message, this encounter can closed.

## 2022-11-29 ENCOUNTER — HOSPITAL ENCOUNTER (OUTPATIENT)
Dept: MAMMOGRAPHY | Facility: CLINIC | Age: 52
Discharge: HOME | End: 2022-11-29
Attending: NURSE PRACTITIONER | Admitting: NURSE PRACTITIONER
Payer: COMMERCIAL

## 2022-11-29 DIAGNOSIS — C50.311: ICD-10-CM

## 2022-11-29 PROCEDURE — 76642 ULTRASOUND BREAST LIMITED: CPT | Mod: RT

## 2022-12-13 ENCOUNTER — LAB REQUISITION (OUTPATIENT)
Dept: LAB | Facility: CLINIC | Age: 52
End: 2022-12-13

## 2022-12-13 PROCEDURE — 36415 COLL VENOUS BLD VENIPUNCTURE: CPT | Performed by: INTERNAL MEDICINE

## 2022-12-13 PROCEDURE — 86735 MUMPS ANTIBODY: CPT | Performed by: INTERNAL MEDICINE

## 2022-12-13 PROCEDURE — 86765 RUBEOLA ANTIBODY: CPT | Performed by: INTERNAL MEDICINE

## 2022-12-13 PROCEDURE — 86762 RUBELLA ANTIBODY: CPT | Performed by: INTERNAL MEDICINE

## 2022-12-14 LAB
MEV IGG SER IA-ACNC: >300 AU/ML
MEV IGG SER IA-ACNC: POSITIVE
MUMPS ANTIBODY IGG INSTRUMENT VALUE: >300 AU/ML
MUV IGG SER QL IA: POSITIVE
RUBV IGG SERPL QL IA: 31.6 INDEX
RUBV IGG SERPL QL IA: POSITIVE

## 2022-12-29 ENCOUNTER — TRANSFERRED RECORDS (OUTPATIENT)
Dept: HEALTH INFORMATION MANAGEMENT | Facility: CLINIC | Age: 52
End: 2022-12-29

## 2023-09-30 ENCOUNTER — HEALTH MAINTENANCE LETTER (OUTPATIENT)
Age: 53
End: 2023-09-30

## 2024-04-01 ENCOUNTER — TELEPHONE (OUTPATIENT)
Dept: FAMILY MEDICINE | Facility: CLINIC | Age: 54
End: 2024-04-01
Payer: COMMERCIAL

## 2024-04-01 NOTE — TELEPHONE ENCOUNTER
Reason for Call:  Appointment Request    Patient requesting this type of appt:  Preventive     Requested provider:  Levar Ferro MD    Reason patient unable to be scheduled: Not within requested timeframe    When does patient want to be seen/preferred time: 1-2 weeks    Comments: est care ,annual     [facility] or would you prefer to receive a phone call?:   Patient would prefer a phone call   Okay to leave a detailed message?: Yes at Cell number on file:    Telephone Information:   Mobile [phone]       Call taken on 4/1/2024 at 4:14 PM by Dixie Cruz

## 2024-04-08 NOTE — TELEPHONE ENCOUNTER
Patient was called to check the status of this request, she would like to be seen for breast pain ASAP.  Negra Norman   [facility]  Central Scheduler

## 2024-05-15 ENCOUNTER — PRE VISIT (OUTPATIENT)
Dept: ONCOLOGY | Facility: CLINIC | Age: 54
End: 2024-05-15
Payer: COMMERCIAL

## 2024-05-15 ENCOUNTER — TRANSCRIBE ORDERS (OUTPATIENT)
Dept: OTHER | Age: 54
End: 2024-05-15

## 2024-05-15 DIAGNOSIS — C50.919 BREAST CANCER (H): Primary | ICD-10-CM

## 2024-05-15 NOTE — TELEPHONE ENCOUNTER
Action Taken  Date/Description  TJ     WT from [facility] May 15, 2024 3:34 PM    Call from:Pt for 2nd opinion   Diagnosis: Breast cancer (H) [C50.919] - Now having continued Rt Side pain that is going untreated, now radiating in back  Date of Dx and Referred By/Location:  9.2012  Records updated in Care Everywhere: [facility]  Has Pt been anywhere else for this condition/concern?  Yes  If yes, where?  [facility]  Prior history of Hematologist, Oncologist: 2012 [facility]  Previous Radiation:  Yes   If yes, when, where and what part of body:  Breast  Genetic Testing conducted:  No  Previous Surgical procedures or biopsies:   Imaging: Nothing since 2022 at [facility]  Any scheduled follow up's/imaging/surgical procedures/biopsies?  No  Any outstanding orders/planning to schedule:  No

## 2024-05-16 ENCOUNTER — PATIENT OUTREACH (OUTPATIENT)
Dept: ONCOLOGY | Facility: CLINIC | Age: 54
End: 2024-05-16
Payer: COMMERCIAL

## 2024-05-16 NOTE — PROGRESS NOTES
New Patient Oncology Nurse Navigator Note     Referring provider: Self      Referred to (specialty:) Medical Oncology     Requested provider (if applicable): Dr. Geovanny Carbajal     Date Referral Received: May 16, 2024     Evaluation for:  C50.919 (ICD-10-CM) - Breast cancer (H)     Clinical History (per Nurse review of records provided):      2012    S/p mastectomy, expander placement.         9/5/12 Case#:[id number]   RIGHT BREAST BLOODY NIPPLE DISCHARGE:   1. Negative for malignancy in this sample   2. Duct-type cells are present   3. Please see comment   COMMENT   The finding of a bloody nipple discharge is concerning. However, the current sample sheds little if any light on the nature of any ductal lesion that is present. We note that this type of specimen is of very low sensitivity, and furthermore, it does nothing to address proliferative change, atypia, or malignancy in the surrounding breast tissue. If there is any clinical suspicion of proliferative disease or malignancy, further sampling would be required to evaluate such processes.     9/14/2012 Case [id number] VERY POOR ORIGINAL  Right breast 3'oclock, 6 cm from nipple, IDC  ER/NE+, HER2 negative by FISH          Shortly after her diagnosis, Linda saw Ana Laura Hart MS, [facility] [place] [facility]. Linda underwent BRCA1/2 sequencing and the BRCA1 5-site rearrangement panel, which was negative ([employer], report dated 10/17/12). BRCA1/2 full gene rearrangement testing ([facility]AYDEN) was ordered but this was cancelled due to a lack of insurance coverage.  Genetic counseling consult and progress notes needed  Genetic test results needed    9/25/2012 - [id number] -  [facility] Lyman and Dr. Cain Curry (plastics) for tissue expander.   Right breast mastectomy and sentinel lymph node biopsy  A.  Right axillary sentinel lymph node without malignancy or pathologic abnormalities.  B.  Right axillary sentinel lymph node #2 without malignancy or  pathologic abnormalities.  C. Specimen, Procedure and Side:   Right breast mastectomy.  Tumor Site:   Three separate tumors at 3 o'clock - 6 cm as well as 5 o'clock, with additional smaller separate tumor nodules in the sample breast quadrant.  Histologic Type:   Invasive ductal  Specimen Integrity:   Intact  Specimen Size:   See Gross Description  Size of Invasive Component:  Three largest tumors measure 1.6, 0.8, and 2.1 cm in greatest dimension with additional smaller tumors measuring from 0.2 to 0.5 cm present.  Ariel Grade: 2 - All invasive tumors have a similar microscopic appearance.  Tubule + Nuclei + Mitoses = Ariel Score:   3 + 2 + 1 = 6  Vascular/Lymphatic Invasion:   Suspicious.  Tumor Focality: Multiple foci.  Macroscopic and Microscopic Extent of Tumor: Tumor is confined to breast parenchyma.  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  Margins:     Invasive Component:   Negative.     Distance to Closest Margin if Negative:   Approximately 2 mm for anterior soft tissue margin.     In Situ Component:   Positive in 2 separate foci at anterior soft tissue margin.  Lymph Nodes and Type:   There are 2 negative sentinel lymph nodes.    Pathologic Staging (pTNM):   pT2 pN0  Estrogen and Progesterone Receptor:   Previously reported as positive for estrogen receptors with 99% of tumor nuclei showing strong staining and positive for progesterone receptors with 90% of tumor nuclei showing strong staining - see [facility]'s pathology report [id number].  HER-2/Knvg:   Previously reported as negative - See same [facility] pathology report.  CAP Protocol Based on AJCC/UICC TNM, 7th edition; Protocol Effective   Date:  January 2010  COMMENT:  On biopsy it was noted that this tumor had some mucinous features.  The tumor is too cellular and also in some areas shows standard features of  infiltrating ductal carcinoma.  This is not a mucinous carcinoma, but an infiltrating ductal carcinoma which in some areas produces mucin.  All three tumors have a similar histologic appearance and are connected by DCIS.  For practical purposes, I believe estrogen and progesterone receptors, as well as HER-2 studies performed on any individual tumor  will yield results representative of all tumors.     2012 - Oncotype RS = 4 (See Chart Review Laboratory 9/25/12)    Radiation oncology consult and progress notes needed from 2012. (A 2015 provider note refers to right chest wall radiation. So even though mastectomy, she DID need radiation per provider notes. Source documents needed    Adjuvant endocrine therapy with Tamoxifen    2015 5/19/2015 - Specimen #: [id number]  Dr. Garcia of [facility] and Dr. Cain Bolden general  Left breast, simple mastectomy -   1  Benign fibrofatty breast tissue without atypia, malignancy, or other   pathologic abnormalities.   2.  No pathologic abnormalities of the skin or nipple   Left first stage breast reconstructon with expander    5/30/2015 - Presented to emergency department with right arm pain and shortness of breath. Had bilateral pulmonary emboli.   Expander rupture based on CT (5/30/15)  Stopped tamoxifen.    Started seeing Dr. Thomas on 10/2/2019 after being with Dr. Mcnamara previously for second opinion.    2020 2/27/2020 - Genetic counseling with Luis Pascal [facility]  TEST PERFORMED:  Breast and Gynecologic Cancers Guidelines-Based Panel (20 genes) via [employer]. See test report for details regarding genes analyzed and testing methodologies.    RESULT: NEGATIVE  No clinically actionable (pathogenic) mutations or other variants were detected in the genes analyzed.  Genetic testing report needed    2022 9/13/22 - CXR 2 views PA and Lateral performed to evaluate pain.     2023    3/20/23 -   No fracture is seen of the lumbar spine. Intervertebral  disc space are well maintained. Alignment is preserved. There is some endplate spurring superior endplate on the right at the L4 level. SI joints are intact.     3/29/23 - Left hip XR  FINDINGS:   No hip joint space narrowing. No fracture. Mild right sacroiliac joint degenerative changes. No erosive change. Pubic symphysis is intact.     Patient resides in [place].     5/16 1338 - Telephoned and left message with female who answered requesting patient call back. She returned call at 14:13 and lvm.    5/7 1050 - Telephoned and left voice message for Linda requesting call back. She did return call.    Last 2-3 months constant right side pain, coming from back, spot on breast. She has not pursued any imaging for this and has been frustrated by trying to get appointments in [facility] system when she needed them.    Linda could not remember the name of provider but believe she was seen at [facility] around 2022.     Records Location: Care Everywhere, Media, and See Bookmarked material     Records Needed:  Path reports-biopsy and surgery (per protocol)  Pathology reviews (per protocol)  Breast imaging for past 5 years  Systemic imaging (per protocol)  Med onc notes, rad notes, OP note, surgeons note (per protocol)  Genetic results (per protocol) X 2   Echo or MUGA results (per protocol)  Plastic surgery consult, operative, and progress notes  Chemotherapy summary (per protocol)    As of 5/27/24 we need med onc records from [id number] AND 2012 rad onc consult and progress notes.

## 2024-05-16 NOTE — TELEPHONE ENCOUNTER
Action    Action Taken 2024 1:38pm VIVIAN   I called [facility] Ph: [phone] and spoke to Marcelle. She said we don't need an LUIS. Records will be faxed to [phone] Attn: Gerri     I [facility] [phone]     2024 4:48pm VIVIAN   I faxed records from [facility] to HIM and the NN Team     RECORDS STATUS - BREAST    RECORDS REQUESTED FROM: [facility]   NOTES DETAILS STATUS   OFFICE NOTE from referring provider SELF    OFFICE NOTE from medical oncologist External: [facility] 22: Dr. Damir Mcnamara   OFFICE NOTE from surgeon     OFFICE NOTE from radiation oncologist     DISCHARGE SUMMARY from hospital Epic 05/19/15, 12: MHTANYA [facility]   OPERATIVE REPORT [facility]: BILATERAL 2ND STAGE RECONSTRUCTION BREAST WITH IMPLANT     05/19/15: LEFT SIMPLE MASTECTOMY (DR. SAUCEDA), LEFT BREAST FIRST STAGE BREAST RECONSTRUCTION WITH TISSUE EXPANDER     12: RIGHT MASTECTOMY WITH RIGHT SENTINEL LYMPH NODE BIOPSY    MEDICATION LIST     LABS     PATHOLOGY REPORTS  (Tissue diagnosis, Stage, ER/NH percentage positive and intensity of staining, HER2 IHC, FISH, and all biopsies from breast and any distant metastasis)                  Epic:  05/19/15: [id number]  12: [id number]    [facility]:  12: [id number]  12: [id number]   PATHOLOGY [employer] TRACKING   TRACKING #:   GENONOMIC TESTING     TYPE:   (Next Generation Sequencing, including Foundation One testing, and Oncotype score) External: [employer] [id number]   IMAGING (NEED IMAGES & REPORT)     CT SCANS     MRI PACS 21, 12: MR Breast   MAMMO PACS PACS:  03/25/15-13   ULTRASOUND PACS PACS:  22, 12: US Breast   PET     BONE SCAN  Sub Im/10/17   BRAIN MRI     IMAGE DISC [employer]EX TRACKING   TRACKING #::

## 2024-06-13 NOTE — PROGRESS NOTES
[facility]    Hematology/Oncology New Patient Note      Today's Date: 06/20/24    Reason for Consult: Right breast cancer.    HISTORY OF PRESENT ILLNESS: Linda Selby is a 54 year old female who presents with the following oncologic history:  1.  8/2012: Presented with brownish right nipple discharge.  Mammogram and ultrasound showed abnormality in the right breast.  2.  9/14/2012: Right breast biopsies at 3:00, 6 cm from nipple, at 3:00, 4 cm from nipple and at 5:00, 4 cm from nipple showed grade 1 invasive ductal carcinoma with associated DCIS.  ER positive, MS positive, HER2/daryn FISH negative.  3.  9/25/2012: Underwent right mastectomy with sentinel lymph node excision under the care of Dr. Sanjuana Lyman.  Pathology showed multiple separate primary tumors, largest measuring 2.1 cm, and another 2 measuring 1.6 cm and 0.8 cm, all grade 2 invasive ductal carcinoma.  Total 2 right axillary sentinel lymph nodes negative for malignancy.  There were additional foci ranging from 0.2 cm to 0.5 cm.  Lymphovascular invasion suspicious.  Extensive grade 2 DCIS also present.  Surgical margins negative for invasive component.  Noninvasive component margins were approximately 2 mm at anterior soft tissue margin.  Oncotype DX = 4.  Previously cared for by Dr. Damir Mcnamara at [facility].    3.  10/19/2012: Linda started on tamoxifen.  4. 12/2012: Received adjuvant radiation therapy to the right chest wall.  5. 5/19/2015: Underwent prophylactic left mastectomy. Pathology benign. Reconstruction with silicone implant with Dr. Bolden.  6.  5/30/2015: Diagnosed with deep vein thrombosis and bilateral pulmonary emboli.  Treated with low molecular weight heparin followed by warfarin for 6 months.  She was using tamoxifen intermittently during that time.  7.  10/21/2016: Tamoxifen discontinued due to DVT/PE.  8. 2/27/2020: Genetics consult. 20-gene Invitae panel negative.    Linda reports constant,  burning, stabbing right chest wall pain for multiple years, progressive over past 2 years. She has noticed right lower medial hyperpigmentation increased over time.    I saw her in 3/2019 when I was at [facility]. She then saw Dr. Uli Thomas in 10/2019 after I had moved my practice to [facility].    REVIEW OF SYSTEMS:   14 point ROS was reviewed and is negative other than as noted above in HPI.       HOME MEDICATIONS:  Current Outpatient Medications   Medication Sig Dispense Refill    albuterol (PROAIR HFA/PROVENTIL HFA/VENTOLIN HFA) 108 (90 Base) MCG/ACT inhaler Inhale 2 puffs into the lungs every 4 hours as needed for shortness of breath / dyspnea or other (cough) 18 g 0    ibuprofen (ADVIL/MOTRIN) 600 MG tablet Take 1 tablet (600 mg) by mouth every 6 hours as needed for pain (mild) (Patient not taking: Reported on 2022) 30 tablet 0    OMEPRAZOLE PO Take 20 mg by mouth every morning       oxyCODONE IR (ROXICODONE) 5 MG tablet Take 1-2 tablets (5-10 mg) by mouth every 3 hours as needed for pain or other (Moderate to Severe) 12 tablet 0         ALLERGIES:  Allergies   Allergen Reactions    Hydromorphone      Altered heart rate    Oxycodone Hcl Nausea         PAST MEDICAL HISTORY:  Past Medical History:   Diagnosis Date    Breast cancer (H)     right    Gastro-oesophageal reflux disease     Migraines     PE (pulmonary thromboembolism) (H)          PAST SURGICAL HISTORY:  Past Surgical History:   Procedure Laterality Date    CARPAL TUNNEL RELEASE RT/LT      CHOLECYSTECTOMY      DILATION AND CURETTAGE, OPERATIVE HYSTEROSCOPY WITH MORCELLATOR, COMBINED N/A 2017    Procedure: COMBINED DILATION AND CURETTAGE, OPERATIVE HYSTEROSCOPY WITH MORCELLATOR;  DILATION AND CURETTAGE, OPERATIVE HYSTEROSCOPY WITH MYOSURE MORCELLATOR ;  Surgeon: Tonie Gomez MD;  Location: [facility]    GYN SURGERY          GYN SURGERY      cervical surgery    INSERT TISSUE EXPANDER BREAST  2012     Procedure: INSERT TISSUE EXPANDER BREAST;  Right Tissue Expander Placement with Alloderm;  Surgeon: Cain Bolden MD;  Location: [facility]    INSERT TISSUE EXPANDER BREAST Left 2015    Procedure: INSERT TISSUE EXPANDER BREAST;  Surgeon: Cain Bolden MD;  Location:  [facility]    MASTECTOMY SIMPLE Left 2015    Procedure: MASTECTOMY SIMPLE;  Surgeon: Sanjuana Lyman MD;  [facility]    MASTECTOMY SIMPLE BILATERAL, SENTINEL NODE BILATERAL, COMBINED  2012    Procedure: COMBINED MASTECTOMY SIMPLE BILATERAL, SENTINEL NODE BILATERAL;  RIGHT MASTECTOMY WITH RIGHT SENTINEL LYMPH NODE BIOPSY(HARMONIC SCALPEL, NEOPROBE) (DR LYMAN), RIGHT TISSUE EXPANDER WITH ALLODERM (DR MYERS;  Surgeon: Sanjuana Lyman MD;  Location:  OR     Gynecologic history:  Age of menarche at 14, first pregnancy at age 30.  No HRT.    SOCIAL HISTORY:  Social History     Socioeconomic History    Marital status:      Spouse name: Not on file    Number of children: Not on file    Years of education: Not on file    Highest education level: Not on file   Occupational History    Not on file   Tobacco Use    Smoking status: Former     Current packs/day: 0.00     Types: Cigarettes     Quit date: 2012     Years since quittin.7    Smokeless tobacco: Former    Tobacco comments:     rare smoker   Substance and Sexual Activity    Alcohol use: No     Comment: 1/month    Drug use: No    Sexual activity: Not on file   Other Topics Concern    Not on file   Social History Narrative    Not on file     Social Determinants of Health     Financial Resource Strain: Low Risk  (3/20/2023)    [facility]    Financial Resource Strain     Difficulty of Paying Living Expenses: 3     Difficulty of Paying Living Expenses: Not on file   Food Insecurity: No Food Insecurity (3/20/2023)    Received from [employer]    Food Insecurity     Worried  About Running Out of Food in the Last Year: 1   Transportation Needs: No Transportation Needs (3/20/2023)    Received from [facility]    Transportation Needs     Lack of Transportation (Medical): 1   Physical Activity: Not on file   Stress: Not on file   Social Connections: Unknown (2024)    [facility]    Social Connections     Frequency of Communication with Friends and Family: Not on file   Interpersonal Safety: Not on file   Housing Stability: Low Risk  (3/20/2023)    [facility]    Housing Stability     Unable to Pay for Housing in the Last Year: 1   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  Smoked cigarettes in the past.  Infrequent alcohol use about 2 alcoholic drinks per month.    FAMILY HISTORY:  Both parents living in [place].  Maternal grandmother with stomach cancer.  Has 7 sisters with no known malignancy.  Has 1 daughter in good health.    PHYSICAL EXAM:  Vital signs:  /69   Pulse 61   Temp 97.7  F (36.5  C) (Oral)   Resp 18   Wt 92.6 kg (204 lb 3.2 oz)   SpO2 96%   BMI 39.88 kg/m     ECO  GENERAL/CONSTITUTIONAL: No acute distress.  EYES:  No scleral icterus.  ENT/MOUTH: Neck supple. Oropharynx grossly clear.  LYMPH: No cervical, supraclavicular, axillary adenopathy.   BREAST: Bilateral breasts are surgically absent with no chest wall masses; right lower medial skin hyperpigmentation and firmer. Radiation-related hyperpigmentation and telangiectasias. No periprosthetic fluid.  RESPIRATORY: Clear to auscultation bilaterally. No crackles or wheezing.   CARDIOVASCULAR: Regular rate and rhythm without murmurs.  GASTROINTESTINAL: No hepatosplenomegaly, masses, or tenderness.  No guarding.  No distention.  MUSCULOSKELETAL: Warm and well-perfused, no cyanosis, clubbing, or edema.  NEUROLOGIC: No focal motor deficits. Alert, oriented, answers questions  appropriately.  INTEGUMENTARY: No rashes or jaundice.  GAIT: Steady, does not use assistive device      LABS:  CBC RESULTS:   Recent Labs   Lab Test 08/14/18  1530   WBC 5.8   RBC 4.22   HGB 12.1   HCT 34.7*   MCV 82   MCH 28.7   MCHC 34.9   RDW 12.6          Recent Labs   Lab Test 08/14/18  1530      POTASSIUM 3.3*   CHLORIDE 106   CO2 26   ANIONGAP 8   *   BUN 11   CR 0.61   ALLY 8.7         PATHOLOGY:  Reviewed as per HPI.    IMAGING:  Reviewed as per HPI.    ASSESSMENT/PLAN:  Linda Selby is a 54 year old female with the following issues:  1.  Pathologic prognostic stage IA, pT2-N0-M0, grade 2 multifocal invasive ductal carcinoma of the right lower inner breast, ER positive, KY positive, HER2  FISH negative, Oncotype DX = 4  2. Right chest wall pain  3. Right lower medial skin hyperpigmentation, increasing  --Linda was diagnosed with multifocal grade 2 invasive ductal carcinoma of the right breast in 9/2012 and underwent right mastectomy.  She underwent adjuvant radiation therapy to the right chest wall under care of Dr. Steffen Fowler.  She received tamoxifen for 4 years through 10/2016 and discontinued due to DVT/PE.  -Overall risk of recurrence is low. However, given persistent right chest wall pain, increasing right lower medial skin hyperpigmentation, and aging silicone implants, will further evaluate with breast MRI.  --Agreed that she should see lymphedema therapy.  --If MRI shows no abnormalities, she can see me on as-needed basis.    Steffi Tellez MD  [facility] Hematology/Oncology    Total time spent today: 60 minutes in chart review, patient evaluation, counseling, documentation, test and/or medication/prescription orders, and coordination of care.

## 2024-06-18 NOTE — TELEPHONE ENCOUNTER
RECORDS STATUS - BREAST    RECORDS REQUESTED FROM: [place], [facility], [place] (Imaging previously resolved)   DATE REQUESTED: 6/17   NOTES DETAILS STATUS   OFFICE NOTE from medical oncologist [facility] 4/22/15 - 12/29/22   OPERATIVE REPORT [facility] [place] [facility]  11/17/17: D & C  5/19/15: Mastectomy, Left  9/25/12: Mastectomy, Right    [facility]  8/4/16: Bilateral 2nd Stage Reconstruction  8/28/08: Laparoscopic Cholecystectomy   LABS     PATHOLOGY REPORTS  (Tissue diagnosis, Stage, ER/MA percentage positive and intensity of staining, HER2 IHC, FISH, and all biopsies from breast and any distant metastasis)                 Epic 5/19/15: [id number]  9/25/12: [id number]   GENONOMIC TESTING     TYPE:   (Next Generation Sequencing, including Foundation One testing, and Oncotype score) Epic/CE - [facility]  2/27/20: [facility]ae  9/24/12: BRCA1/2    [facility]  9/25/12: Oncotype

## 2024-06-20 ENCOUNTER — PRE VISIT (OUTPATIENT)
Dept: ONCOLOGY | Facility: CLINIC | Age: 54
End: 2024-06-20
Payer: COMMERCIAL

## 2024-06-20 ENCOUNTER — ONCOLOGY VISIT (OUTPATIENT)
Dept: ONCOLOGY | Facility: CLINIC | Age: 54
End: 2024-06-20
Attending: INTERNAL MEDICINE
Payer: COMMERCIAL

## 2024-06-20 VITALS
HEART RATE: 61 BPM | TEMPERATURE: 97.7 F | BODY MASS INDEX: 39.88 KG/M2 | DIASTOLIC BLOOD PRESSURE: 69 MMHG | WEIGHT: 204.2 LBS | SYSTOLIC BLOOD PRESSURE: 106 MMHG | OXYGEN SATURATION: 96 % | RESPIRATION RATE: 18 BRPM

## 2024-06-20 DIAGNOSIS — L81.9 HYPERPIGMENTATION OF SKIN: ICD-10-CM

## 2024-06-20 DIAGNOSIS — Z17.0 MALIGNANT NEOPLASM OF LOWER-INNER QUADRANT OF RIGHT BREAST OF FEMALE, ESTROGEN RECEPTOR POSITIVE (H): Primary | ICD-10-CM

## 2024-06-20 DIAGNOSIS — C50.311 MALIGNANT NEOPLASM OF LOWER-INNER QUADRANT OF RIGHT BREAST OF FEMALE, ESTROGEN RECEPTOR POSITIVE (H): Primary | ICD-10-CM

## 2024-06-20 DIAGNOSIS — R07.89 RIGHT-SIDED CHEST WALL PAIN: ICD-10-CM

## 2024-06-20 PROCEDURE — 99213 OFFICE O/P EST LOW 20 MIN: CPT | Performed by: INTERNAL MEDICINE

## 2024-06-20 PROCEDURE — 99205 OFFICE O/P NEW HI 60 MIN: CPT | Performed by: INTERNAL MEDICINE

## 2024-06-20 RX ORDER — SUMATRIPTAN 100 MG/1
100 TABLET, FILM COATED ORAL
COMMUNITY
Start: 2023-03-22

## 2024-06-20 ASSESSMENT — PAIN SCALES - GENERAL: PAINLEVEL: MILD PAIN (2)

## 2024-06-20 NOTE — PROGRESS NOTES
Oncology Rooming Note    June 20, 2024 11:08 AM   Linda Selby is a 54 year old female who presents for:    Chief Complaint   Patient presents with    Oncology Clinic Visit     Initial Vitals: There were no vitals taken for this visit. Estimated body mass index is 40.62 kg/m  as calculated from the following:    Height as of 8/14/18: 1.524 m (5').    Weight as of 8/14/18: 94.3 kg (208 lb). There is no height or weight on file to calculate BSA.  Data Unavailable Comment: Data Unavailable   No LMP recorded.  Allergies reviewed: Yes  Medications reviewed: Yes    Medications: Medication refills not needed today.  Pharmacy name entered into [employer]:    [facility] [id number] [place] [facility] [address] [facility]  [facility] [id number] [place], [place] - [address]    Frailty Screening:   Is the patient here for a new oncology consult visit in cancer care? 1. Yes. Over the past month, have you experienced difficulty or required a caregiver to assist with:   1. Balance, walking or general mobility (including any falls)? NO  2. Completion of self-care tasks such as bathing, dressing, toileting, grooming/hygiene?  NO  3. Concentration or memory that affects your daily life?  NO       Clinical concerns: worried about cancer.  What is the plan?  Dr. Tellez  was notified.      Shari J. Schoenberger, [facility]

## 2024-06-20 NOTE — LETTER
6/20/2024      Linda Selby  [address]  [place] [address]      Dear Colleague,    Thank you for referring your patient, Linda Selby, to the [facility]. Please see a copy of my visit note below.    [facility]    Hematology/Oncology New Patient Note      Today's Date: 06/20/24    Reason for Consult: Right breast cancer.    HISTORY OF PRESENT ILLNESS: Linda Selby is a 54 year old female who presents with the following oncologic history:  1. 8/2012: Presented with brownish right nipple discharge.  Mammogram and ultrasound showed abnormality in the right breast.  2.  9/14/2012: Right breast biopsies at 3:00, 6 cm from nipple, at 3:00, 4 cm from nipple and at 5:00, 4 cm from nipple showed grade 1 invasive ductal carcinoma with associated DCIS.  ER positive, KY positive, HER2/daryn FISH negative.  3.  9/25/2012: Underwent right mastectomy with sentinel lymph node excision under the care of Dr. Sanjuana Lyman.  Pathology showed multiple separate primary tumors, largest measuring 2.1 cm, and another 2 measuring 1.6 cm and 0.8 cm, all grade 2 invasive ductal carcinoma.  Total 2 right axillary sentinel lymph nodes negative for malignancy.  There were additional foci ranging from 0.2 cm to 0.5 cm.  Lymphovascular invasion suspicious.  Extensive grade 2 DCIS also present.  Surgical margins negative for invasive component.  Noninvasive component margins were approximately 2 mm at anterior soft tissue margin.  Oncotype DX = 4.  Previously cared for by Dr. Damir Mcnamara at [facility].    3.  10/19/2012: Linda started on tamoxifen.  4. 12/2012: Received adjuvant radiation therapy to the right chest wall.  5. 5/19/2015: Underwent prophylactic left mastectomy. Pathology benign. Reconstruction with silicone implant with Dr. Bolden.  6.  5/30/2015: Diagnosed with deep vein thrombosis and bilateral pulmonary emboli.  Treated with low molecular weight  heparin followed by warfarin for 6 months.  She was using tamoxifen intermittently during that time.  7.  10/21/2016: Tamoxifen discontinued due to DVT/PE.  8. 2/27/2020: Genetics consult. 20-gene Invitae panel negative.    Linda reports constant, burning, stabbing right chest wall pain for multiple years, progressive over past 2 years. She has noticed right lower medial hyperpigmentation increased over time.    I saw her in 3/2019 when I was at [facility]. She then saw Dr. Uli Thomas in 10/2019 after I had moved my practice to [facility].    REVIEW OF SYSTEMS:   14 point ROS was reviewed and is negative other than as noted above in HPI.       HOME MEDICATIONS:  Current Outpatient Medications   Medication Sig Dispense Refill     albuterol (PROAIR HFA/PROVENTIL HFA/VENTOLIN HFA) 108 (90 Base) MCG/ACT inhaler Inhale 2 puffs into the lungs every 4 hours as needed for shortness of breath / dyspnea or other (cough) 18 g 0     ibuprofen (ADVIL/MOTRIN) 600 MG tablet Take 1 tablet (600 mg) by mouth every 6 hours as needed for pain (mild) (Patient not taking: Reported on 5/17/2022) 30 tablet 0     OMEPRAZOLE PO Take 20 mg by mouth every morning        oxyCODONE IR (ROXICODONE) 5 MG tablet Take 1-2 tablets (5-10 mg) by mouth every 3 hours as needed for pain or other (Moderate to Severe) 12 tablet 0         ALLERGIES:  Allergies   Allergen Reactions     Hydromorphone      Altered heart rate     Oxycodone Hcl Nausea         PAST MEDICAL HISTORY:  Past Medical History:   Diagnosis Date     Breast cancer (H)     right     Gastro-oesophageal reflux disease      Migraines      PE (pulmonary thromboembolism) (H)          PAST SURGICAL HISTORY:  Past Surgical History:   Procedure Laterality Date     CARPAL TUNNEL RELEASE RT/LT       CHOLECYSTECTOMY       DILATION AND CURETTAGE, OPERATIVE HYSTEROSCOPY WITH MORCELLATOR, COMBINED N/A 11/17/2017    Procedure: COMBINED DILATION AND CURETTAGE, OPERATIVE HYSTEROSCOPY WITH  MORCELLATOR;  DILATION AND CURETTAGE, OPERATIVE HYSTEROSCOPY WITH MYOSURE MORCELLATOR ;  Surgeon: Tonie Gomez MD;  Location: [facility]     GYN SURGERY           GYN SURGERY      cervical surgery     INSERT TISSUE EXPANDER BREAST  2012    Procedure: INSERT TISSUE EXPANDER BREAST;  Right Tissue Expander Placement with Alloderm;  Surgeon: Cain Bolden MD;  Location:  OR     INSERT TISSUE EXPANDER BREAST Left 2015    Procedure: INSERT TISSUE EXPANDER BREAST;  Surgeon: Cain Bolden MD;  Location: [facility]     MASTECTOMY SIMPLE Left 2015    Procedure: MASTECTOMY SIMPLE;  Surgeon: Sanjuana Lyman MD;  Location: [facility]     MASTECTOMY SIMPLE BILATERAL, SENTINEL NODE BILATERAL, COMBINED  2012    Procedure: COMBINED MASTECTOMY SIMPLE BILATERAL, SENTINEL NODE BILATERAL;  RIGHT MASTECTOMY WITH RIGHT SENTINEL LYMPH NODE BIOPSY(HARMONIC SCALPEL, NEOPROBE) (DR LYMAN), RIGHT TISSUE EXPANDER WITH ALLODERM (DR MYERS;  Surgeon: Sanjuana Lyman MD;  Location:  OR     Gynecologic history:  Age of menarche at 14, first pregnancy at age 30.  No HRT.    SOCIAL HISTORY:  Social History     Socioeconomic History     Marital status:      Spouse name: Not on file     Number of children: Not on file     Years of education: Not on file     Highest education level: Not on file   Occupational History     Not on file   Tobacco Use     Smoking status: Former     Current packs/day: 0.00     Types: Cigarettes     Quit date: 2012     Years since quittin.7     Smokeless tobacco: Former     Tobacco comments:     rare smoker   Substance and Sexual Activity     Alcohol use: No     Comment: 1/month     Drug use: No     Sexual activity: Not on file   Other Topics Concern     Not on file   Social History Narrative     Not on file     Social Determinants of Health     Financial Resource Strain: Low Risk  (3/20/2023)    [facility]    Financial Resource Strain      Difficulty of Paying Living Expenses: 3      Difficulty of Paying Living Expenses: Not on file   Food Insecurity: No Food Insecurity (3/20/2023)    Received from [facility]    Food Insecurity      Worried About Running Out of Food in the Last Year: 1   Transportation Needs: No Transportation Needs (3/20/2023)    Received from [facility]    Transportation Needs      Lack of Transportation (Medical): 1   Physical Activity: Not on file   Stress: Not on file   Social Connections: Unknown (2024)    Received from [facility]    Social Connections      Frequency of Communication with Friends and Family: Not on file   Interpersonal Safety: Not on file   Housing Stability: Low Risk  (3/20/2023)    Received from [facility]    Housing Stability      Unable to Pay for Housing in the Last Year: 1   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  Infrequent alcohol use about 2 alcoholic drinks per month.    FAMILY HISTORY:  Both parents living in [place].  Maternal grandmother with stomach cancer.  Has 7 sisters with no known malignancy.  Has 1 daughter in good health.    PHYSICAL EXAM:  Vital signs:  /69   Pulse 61   Temp 97.7  F (36.5  C) (Oral)   Resp 18   Wt 92.6 kg (204 lb 3.2 oz)   SpO2 96%   BMI 39.88 kg/m     ECO  GENERAL/CONSTITUTIONAL: No acute distress.  EYES:  No scleral icterus.  ENT/MOUTH: Neck supple. Oropharynx grossly clear.  LYMPH: No cervical, supraclavicular, axillary adenopathy.   BREAST: Bilateral breasts are surgically absent with no chest wall masses; right lower medial skin hyperpigmentation and firmer. Radiation-related hyperpigmentation and telangiectasias. No periprosthetic fluid.  RESPIRATORY: Clear to auscultation bilaterally. No crackles or wheezing.   CARDIOVASCULAR:  Regular rate and rhythm without murmurs.  GASTROINTESTINAL: No hepatosplenomegaly, masses, or tenderness.  No guarding.  No distention.  MUSCULOSKELETAL: Warm and well-perfused, no cyanosis, clubbing, or edema.  NEUROLOGIC: No focal motor deficits. Alert, oriented, answers questions appropriately.  INTEGUMENTARY: No rashes or jaundice.  GAIT: Steady, does not use assistive device      LABS:  CBC RESULTS:   Recent Labs   Lab Test 08/14/18  1530   WBC 5.8   RBC 4.22   HGB 12.1   HCT 34.7*   MCV 82   MCH 28.7   MCHC 34.9   RDW 12.6          Recent Labs   Lab Test 08/14/18  1530      POTASSIUM 3.3*   CHLORIDE 106   CO2 26   ANIONGAP 8   *   BUN 11   CR 0.61   ALLY 8.7         PATHOLOGY:  Reviewed as per HPI.    IMAGING:  Reviewed as per HPI.    ASSESSMENT/PLAN:  Linda Selby is a 54 year old female with the following issues:  1.  Pathologic prognostic stage IA, pT2-N0-M0, grade 2 multifocal invasive ductal carcinoma of the right lower inner breast, ER positive, TX positive, HER2  FISH negative, Oncotype DX = 4  2. Right chest wall pain  3. Right lower medial skin hyperpigmentation, increasing  --Linda was diagnosed with multifocal grade 2 invasive ductal carcinoma of the right breast in 9/2012 and underwent right mastectomy.  She underwent adjuvant radiation therapy to the right chest wall under care of Dr. Steffen Fowler.  She received tamoxifen for 4 years through 10/2016 and discontinued due to DVT/PE.  -Overall risk of recurrence is low. However, given persistent right chest wall pain, increasing right lower medial skin hyperpigmentation, and aging silicone implants, will further evaluate with breast MRI.  --Agreed that she should see lymphedema therapy.  --If MRI shows no abnormalities, she can see me on as-needed basis.    Steffi Tellez MD  [facility] Hematology/Oncology    Total time spent today: 60 minutes in chart review, patient evaluation, counseling, documentation, test and/or  medication/prescription orders, and coordination of care.      Oncology Rooming Note    June 20, 2024 11:08 AM   Linda Selby is a 54 year old female who presents for:    Chief Complaint   Patient presents with     Oncology Clinic Visit     Initial Vitals: There were no vitals taken for this visit. Estimated body mass index is 40.62 kg/m  as calculated from the following:    Height as of 8/14/18: 1.524 m (5').    Weight as of 8/14/18: 94.3 kg (208 lb). There is no height or weight on file to calculate BSA.  Data Unavailable Comment: Data Unavailable   No LMP recorded.  Allergies reviewed: Yes  Medications reviewed: Yes    Medications: Medication refills not needed today.  Pharmacy name entered into [employer]:    [id number] [place], [place] [facility] [address]  [address] [id number] [place], [place] - [address]    Frailty Screening:   Is the patient here for a new oncology consult visit in cancer care? 1. Yes. Over the past month, have you experienced difficulty or required a caregiver to assist with:   1. Balance, walking or general mobility (including any falls)? NO  2. Completion of self-care tasks such as bathing, dressing, toileting, grooming/hygiene?  NO  3. Concentration or memory that affects your daily life?  NO       Clinical concerns: worried about cancer.  What is the plan?  Dr. Tellez  was notified.      Shari J. Schoenberger, [facility]                Again, thank you for allowing me to participate in the care of your patient.        Sincerely,        Steffi Tellez MD

## 2024-07-12 ENCOUNTER — OFFICE VISIT (OUTPATIENT)
Dept: FAMILY MEDICINE | Facility: CLINIC | Age: 54
End: 2024-07-12
Payer: COMMERCIAL

## 2024-07-12 ENCOUNTER — ANCILLARY PROCEDURE (OUTPATIENT)
Dept: GENERAL RADIOLOGY | Facility: CLINIC | Age: 54
End: 2024-07-12
Attending: INTERNAL MEDICINE
Payer: COMMERCIAL

## 2024-07-12 VITALS
SYSTOLIC BLOOD PRESSURE: 137 MMHG | BODY MASS INDEX: 40.76 KG/M2 | OXYGEN SATURATION: 96 % | HEIGHT: 59 IN | DIASTOLIC BLOOD PRESSURE: 77 MMHG | HEART RATE: 82 BPM | WEIGHT: 202.2 LBS | TEMPERATURE: 98 F | RESPIRATION RATE: 18 BRPM

## 2024-07-12 DIAGNOSIS — M54.2 NECK PAIN: ICD-10-CM

## 2024-07-12 DIAGNOSIS — I26.99 PULMONARY EMBOLISM, BILATERAL (H): ICD-10-CM

## 2024-07-12 DIAGNOSIS — E66.01 MORBID OBESITY (H): ICD-10-CM

## 2024-07-12 DIAGNOSIS — R52 DIFFUSE PAIN: ICD-10-CM

## 2024-07-12 DIAGNOSIS — M25.50 MULTIPLE JOINT PAIN: ICD-10-CM

## 2024-07-12 DIAGNOSIS — Z00.00 ENCOUNTER FOR ANNUAL PHYSICAL EXAM: Primary | ICD-10-CM

## 2024-07-12 DIAGNOSIS — C50.919 MALIGNANT NEOPLASM OF FEMALE BREAST, UNSPECIFIED ESTROGEN RECEPTOR STATUS, UNSPECIFIED LATERALITY, UNSPECIFIED SITE OF BREAST (H): ICD-10-CM

## 2024-07-12 LAB
ALBUMIN SERPL BCG-MCNC: 4.5 G/DL (ref 3.5–5.2)
ALP SERPL-CCNC: 113 U/L (ref 40–150)
ALT SERPL W P-5'-P-CCNC: 35 U/L (ref 0–50)
ANION GAP SERPL CALCULATED.3IONS-SCNC: 11 MMOL/L (ref 7–15)
AST SERPL W P-5'-P-CCNC: 33 U/L (ref 0–45)
BILIRUB SERPL-MCNC: 0.7 MG/DL
BUN SERPL-MCNC: 14.7 MG/DL (ref 6–20)
CALCIUM SERPL-MCNC: 9.6 MG/DL (ref 8.6–10)
CHLORIDE SERPL-SCNC: 106 MMOL/L (ref 98–107)
CHOLEST SERPL-MCNC: 217 MG/DL
CREAT SERPL-MCNC: 0.64 MG/DL (ref 0.51–0.95)
CRP SERPL-MCNC: 5.91 MG/L
DEPRECATED HCO3 PLAS-SCNC: 26 MMOL/L (ref 22–29)
EGFRCR SERPLBLD CKD-EPI 2021: >90 ML/MIN/1.73M2
ERYTHROCYTE [DISTWIDTH] IN BLOOD BY AUTOMATED COUNT: 12.3 % (ref 10–15)
ERYTHROCYTE [SEDIMENTATION RATE] IN BLOOD BY WESTERGREN METHOD: 27 MM/HR (ref 0–30)
FASTING STATUS PATIENT QL REPORTED: YES
FASTING STATUS PATIENT QL REPORTED: YES
GLUCOSE SERPL-MCNC: 108 MG/DL (ref 70–99)
HCT VFR BLD AUTO: 36.6 % (ref 35–47)
HCV AB SERPL QL IA: NONREACTIVE
HDLC SERPL-MCNC: 55 MG/DL
HGB BLD-MCNC: 12.4 G/DL (ref 11.7–15.7)
HIV 1+2 AB+HIV1 P24 AG SERPL QL IA: NONREACTIVE
LDLC SERPL CALC-MCNC: 131 MG/DL
MCH RBC QN AUTO: 27.9 PG (ref 26.5–33)
MCHC RBC AUTO-ENTMCNC: 33.9 G/DL (ref 31.5–36.5)
MCV RBC AUTO: 82 FL (ref 78–100)
NONHDLC SERPL-MCNC: 162 MG/DL
PLATELET # BLD AUTO: 274 10E3/UL (ref 150–450)
POTASSIUM SERPL-SCNC: 3.4 MMOL/L (ref 3.4–5.3)
PROT SERPL-MCNC: 7.6 G/DL (ref 6.4–8.3)
RBC # BLD AUTO: 4.44 10E6/UL (ref 3.8–5.2)
RHEUMATOID FACT SERPL-ACNC: <10 IU/ML
SODIUM SERPL-SCNC: 143 MMOL/L (ref 135–145)
TRIGL SERPL-MCNC: 155 MG/DL
TSH SERPL DL<=0.005 MIU/L-ACNC: 2.51 UIU/ML (ref 0.3–4.2)
WBC # BLD AUTO: 5.4 10E3/UL (ref 4–11)

## 2024-07-12 PROCEDURE — 84443 ASSAY THYROID STIM HORMONE: CPT | Performed by: INTERNAL MEDICINE

## 2024-07-12 PROCEDURE — 85027 COMPLETE CBC AUTOMATED: CPT | Performed by: INTERNAL MEDICINE

## 2024-07-12 PROCEDURE — 86140 C-REACTIVE PROTEIN: CPT | Performed by: INTERNAL MEDICINE

## 2024-07-12 PROCEDURE — 72040 X-RAY EXAM NECK SPINE 2-3 VW: CPT | Mod: TC | Performed by: PREVENTIVE MEDICINE

## 2024-07-12 PROCEDURE — 85652 RBC SED RATE AUTOMATED: CPT | Performed by: INTERNAL MEDICINE

## 2024-07-12 PROCEDURE — 36415 COLL VENOUS BLD VENIPUNCTURE: CPT | Performed by: INTERNAL MEDICINE

## 2024-07-12 PROCEDURE — 80061 LIPID PANEL: CPT | Performed by: INTERNAL MEDICINE

## 2024-07-12 PROCEDURE — 99213 OFFICE O/P EST LOW 20 MIN: CPT | Mod: 25 | Performed by: INTERNAL MEDICINE

## 2024-07-12 PROCEDURE — 87389 HIV-1 AG W/HIV-1&-2 AB AG IA: CPT | Performed by: INTERNAL MEDICINE

## 2024-07-12 PROCEDURE — 86038 ANTINUCLEAR ANTIBODIES: CPT | Performed by: INTERNAL MEDICINE

## 2024-07-12 PROCEDURE — 99396 PREV VISIT EST AGE 40-64: CPT | Performed by: INTERNAL MEDICINE

## 2024-07-12 PROCEDURE — 80053 COMPREHEN METABOLIC PANEL: CPT | Performed by: INTERNAL MEDICINE

## 2024-07-12 PROCEDURE — 86803 HEPATITIS C AB TEST: CPT | Performed by: INTERNAL MEDICINE

## 2024-07-12 PROCEDURE — 86431 RHEUMATOID FACTOR QUANT: CPT | Performed by: INTERNAL MEDICINE

## 2024-07-12 SDOH — HEALTH STABILITY: PHYSICAL HEALTH: ON AVERAGE, HOW MANY DAYS PER WEEK DO YOU ENGAGE IN MODERATE TO STRENUOUS EXERCISE (LIKE A BRISK WALK)?: 4 DAYS

## 2024-07-12 SDOH — HEALTH STABILITY: PHYSICAL HEALTH: ON AVERAGE, HOW MANY MINUTES DO YOU ENGAGE IN EXERCISE AT THIS LEVEL?: 20 MIN

## 2024-07-12 ASSESSMENT — PAIN SCALES - GENERAL: PAINLEVEL: MODERATE PAIN (5)

## 2024-07-12 ASSESSMENT — SOCIAL DETERMINANTS OF HEALTH (SDOH): HOW OFTEN DO YOU GET TOGETHER WITH FRIENDS OR RELATIVES?: ONCE A WEEK

## 2024-07-12 NOTE — RESULT ENCOUNTER NOTE
It was very nice meeting you today.  Your neck x-ray looks normal.  The next step would be physical therapy.  Would it be okay if I order that for you?  Please let me know.    Trung Santamaria M.D.

## 2024-07-12 NOTE — PROGRESS NOTES
Preventive Care Visit  [facility] CMAILLE Santamaria MD, Internal Medicine  Jul 12, 2024          Raghavendra Mckeon is a 54 year old, presenting for the following:    This is a very pleasant 54-year-old who is new to me.  She is here for her physical.  I reviewed her health history.  She is up-to-date with her oncologist.  She has several issues.    Her weight is an issue.  We discussed exercise and diet and I will refer her to dietary.  We briefly discussed the GLP-1 agents.    She has a history of breast cancer with no evidence of disease.  Some recent changes to the skin so she is having a breast MRI soon.    She has a history of PE and DVT related to prior tamoxifen use.  She has been off of that.  She has had no recurrence of clotting    She has diffuse pain as well as joint pains.  She was seen by rheumatology which I reviewed but it is unclear if a diagnosis was made.  He has been for many years.  Pains are in her joint and multiple other areas.    She has had neck pain for 6 months.  Its mostly when she turns her in neck to the sides.  There is no radiating pain or arm symptoms.    She does not exercise a lot.  She works as a nursing assistant.  She is  and has 1 daughter.               Past Medical History:      Past Medical History:   Diagnosis Date    Breast cancer (H) 2012    right, had mastectomy and xrt, left prophylactic mastectomy, implants, tamoxifen for 4 years, stopped due to pe    Gastro-oesophageal reflux disease     Migraines     PE (pulmonary thromboembolism) (H) 2015    and dvt, felt to be due to tamoxifen use             Past Surgical History:      Past Surgical History:   Procedure Laterality Date    CARPAL TUNNEL RELEASE RT/LT      CHOLECYSTECTOMY      DILATION AND CURETTAGE, OPERATIVE HYSTEROSCOPY WITH MORCELLATOR, COMBINED N/A 11/17/2017    Procedure: COMBINED DILATION AND CURETTAGE, OPERATIVE HYSTEROSCOPY WITH MORCELLATOR;  DILATION AND CURETTAGE, OPERATIVE  HYSTEROSCOPY WITH MYOSURE MORCELLATOR ;  Surgeon: Tonie Gomez MD;  Location: [facility]    GYN SURGERY          GYN SURGERY      cervical surgery    INSERT TISSUE EXPANDER BREAST  2012    Procedure: INSERT TISSUE EXPANDER BREAST;  Right Tissue Expander Placement with Alloderm;  Surgeon: Cain Bolden MD;  Location:  OR    INSERT TISSUE EXPANDER BREAST Left 2015    Procedure: INSERT TISSUE EXPANDER BREAST;  Surgeon: Cain Bolden MD;  Location:  [facility]    MASTECTOMY SIMPLE Left 2015    Procedure: MASTECTOMY SIMPLE;  Surgeon: Sanjuana Lyman MD;  Location: [facility]    MASTECTOMY SIMPLE BILATERAL, SENTINEL NODE BILATERAL, COMBINED  2012    Procedure: COMBINED MASTECTOMY SIMPLE BILATERAL, SENTINEL NODE BILATERAL;  RIGHT MASTECTOMY WITH RIGHT SENTINEL LYMPH NODE BIOPSY(HARMONIC SCALPEL, NEOPROBE) (DR LYMAN), RIGHT TISSUE EXPANDER WITH ALLODERM (DR MYERS;  Surgeon: Sanjuana Lyman MD;  Location:  OR             Social History:     Social History     Socioeconomic History    Marital status:      Spouse name: Not on file    Number of children: 1    Years of education: Not on file    Highest education level: Not on file   Occupational History    Occupation: nursing assistant at [employer]   Tobacco Use    Smoking status: Former     Current packs/day: 0.00     Types: Cigarettes     Quit date: 2012     Years since quittin.8    Smokeless tobacco: Former    Tobacco comments:     rare smoker   Substance and Sexual Activity    Alcohol use: No     Comment: 1/month    Drug use: No    Sexual activity: Not on file   Other Topics Concern    Not on file   Social History Narrative    Not on file     Social Determinants of Health     Financial Resource Strain: Low Risk  (2024)    Financial Resource Strain     Within the past 12 months, have you or your family members you live with been unable to get utilities (heat, electricity) when it was really  needed?: No   Food Insecurity: Low Risk  (7/12/2024)    Food Insecurity     Within the past 12 months, did you worry that your food would run out before you got money to buy more?: No     Within the past 12 months, did the food you bought just not last and you didn t have money to get more?: No   Transportation Needs: Low Risk  (7/12/2024)    Transportation Needs     Within the past 12 months, has lack of transportation kept you from medical appointments, getting your medicines, non-medical meetings or appointments, work, or from getting things that you need?: No   Physical Activity: Insufficiently Active (7/12/2024)    Exercise Vital Sign     Days of Exercise per Week: 4 days     Minutes of Exercise per Session: 20 min   Stress: No Stress Concern Present (7/12/2024)    [employer] [place] [employer] - Occupational Stress Questionnaire     Feeling of Stress : Only a little   Social Connections: Unknown (7/12/2024)    Social Connection and Isolation Panel [NHANES]     Frequency of Communication with Friends and Family: Not on file     Frequency of Social Gatherings with Friends and Family: Once a week     Attends Jew Services: Not on file     Active Member of Clubs or Organizations: Not on file     Attends Club or Organization Meetings: Not on file     Marital Status: Not on file   Interpersonal Safety: Low Risk  (7/12/2024)    Interpersonal Safety     Do you feel physically and emotionally safe where you currently live?: Yes     Within the past 12 months, have you been hit, slapped, kicked or otherwise physically hurt by someone?: No     Within the past 12 months, have you been humiliated or emotionally abused in other ways by your partner or ex-partner?: No   Housing Stability: Low Risk  (7/12/2024)    Housing Stability     Do you have housing? : Yes     Are you worried about losing your housing?: No             Family History:   reviewed         Allergies:     Allergies   Allergen Reactions     "Hydromorphone      Altered heart rate    Oxycodone Hcl Nausea             Medications:     Current Outpatient Medications   Medication Sig Dispense Refill    OMEPRAZOLE PO Take 20 mg by mouth every morning       SUMAtriptan (IMITREX) 100 MG tablet Take 100 mg by mouth                 Review of Systems:     The 10 point Review of Systems is negative other than noted in the HPI           Physical Exam:   Blood pressure 137/77, pulse 82, temperature 98  F (36.7  C), temperature source Temporal, resp. rate 18, height 1.51 m (4' 11.45\"), weight 91.7 kg (202 lb 3.2 oz), last menstrual period 10/13/2017, SpO2 96%, not currently breastfeeding.    Exam:  Constitutional: healthy appearing, alert and in no distress  Heent: Normocephalic. Head without obvious masses or lesions. PERRLDC, EOMI. Mouth exam within normal limits: tongue, mucous membranes, posterior pharynx all normal, no lesions or abnormalities seen.  Tm's and canals within normal limits bilaterally. Neck supple, no nuchal rigidity or masses. No supraclavicular, or cervical adenopathy. Thyroid symmetric, no masses.  Cardiovascular: Regular rate and rhythm, no murmer, rub or gallops.  JVP not elevated, no edema.  Carotids within normal limits bilaterally, no bruits.  Respiratory: Normal respiratory effort.  Lungs clear, normal flow, no wheezing or crackles.  Gastrointestinal: Normal active bowel sounds.   Soft, not tender, no masses, guarding or rebound.  No hepatosplenomegaly.   Musculoskeletal: extremities normal, no gross deformities noted.  No neck masses or lesions.  No joint swelling, redness or warmth of the hands or wrists  Skin: no suspicious lesions or rashes   Neurologic: Mental status within normal limits.  Speech fluent.  No gross motor abnormalities and gait intact.  Psychiatric: mentation appears normal and affect normal.         Data:   Labs sent, x-ray to be done        Assessment:   Normal complete physical exam  Breast cancer, no evidence of " disease, follow-up oncology  Prior PE and DVT, due to tamoxifen, no recurrence  Morbid obesity, needs weight loss, will refer to dietary  Neck pain, suspect musculoskeletal, check x-ray and if negative consider physical therapy  Diffuse pain/joint pains, suspect fibromyalgia, will check labs  Healthcare maintenance         Plan:   Up-to-date Pap smear  Breast MRI as noted above  X-ray C-spine and consider physical therapy  Letter with labs  Consider rheumatology  Routine colonoscopy  COVID shot at pharmacy      Trung Santamaria M.D.

## 2024-07-15 LAB — ANA SER QL IF: NEGATIVE

## 2024-07-16 NOTE — RESULT ENCOUNTER NOTE
Kev Mckeon, I am covering for Dr Santamaria as he is out of the office.   Your Autoimmune marker LESLY and rheumatoid factor are negative.   Electorlyte and kidney panel are normal   Cholesterol is slightly above optimal. This can be improved with diet and exercise.   Inflammatory markers, CRP, ESR are normal   Blood counts are normal without anemia.   After reviewing your chart it is possible that a lot of your symptoms are related to no estrogen associated with menopause. The lack of estrogen can lead to many symptoms including joint pains. Let me know if I can answer any questions for you  Romaine Lr, SONA

## 2024-07-25 ENCOUNTER — HOSPITAL ENCOUNTER (OUTPATIENT)
Dept: MRI IMAGING | Facility: CLINIC | Age: 54
Discharge: HOME | End: 2024-07-25
Attending: INTERNAL MEDICINE | Admitting: INTERNAL MEDICINE
Payer: COMMERCIAL

## 2024-07-25 DIAGNOSIS — R07.89 RIGHT-SIDED CHEST WALL PAIN: ICD-10-CM

## 2024-07-25 DIAGNOSIS — C50.311 MALIGNANT NEOPLASM OF LOWER-INNER QUADRANT OF RIGHT BREAST OF FEMALE, ESTROGEN RECEPTOR POSITIVE (H): ICD-10-CM

## 2024-07-25 DIAGNOSIS — Z17.0 MALIGNANT NEOPLASM OF LOWER-INNER QUADRANT OF RIGHT BREAST OF FEMALE, ESTROGEN RECEPTOR POSITIVE (H): ICD-10-CM

## 2024-07-25 PROCEDURE — 255N000002 HC RX 255 OP 636: Performed by: INTERNAL MEDICINE

## 2024-07-25 PROCEDURE — 77049 MRI BREAST C-+ W/CAD BI: CPT

## 2024-07-25 PROCEDURE — A9585 GADOBUTROL INJECTION: HCPCS | Performed by: INTERNAL MEDICINE

## 2024-07-25 RX ORDER — GADOBUTROL 604.72 MG/ML
9 INJECTION INTRAVENOUS ONCE
Status: COMPLETED | OUTPATIENT
Start: 2024-07-25 | End: 2024-07-25

## 2024-07-25 RX ADMIN — GADOBUTROL 9 ML: 604.72 INJECTION INTRAVENOUS at 08:40

## 2024-08-28 ENCOUNTER — TELEPHONE (OUTPATIENT)
Dept: ONCOLOGY | Facility: CLINIC | Age: 54
End: 2024-08-28
Payer: COMMERCIAL

## 2024-08-28 ENCOUNTER — PATIENT OUTREACH (OUTPATIENT)
Dept: ONCOLOGY | Facility: CLINIC | Age: 54
End: 2024-08-28
Payer: COMMERCIAL

## 2024-08-28 DIAGNOSIS — C50.311 MALIGNANT NEOPLASM OF LOWER-INNER QUADRANT OF RIGHT BREAST OF FEMALE, ESTROGEN RECEPTOR POSITIVE (H): Primary | ICD-10-CM

## 2024-08-28 DIAGNOSIS — Z17.0 MALIGNANT NEOPLASM OF LOWER-INNER QUADRANT OF RIGHT BREAST OF FEMALE, ESTROGEN RECEPTOR POSITIVE (H): Primary | ICD-10-CM

## 2024-08-28 NOTE — TELEPHONE ENCOUNTER
"Spoke with Linda to relay Dr Tellez's message per below    \"  Not sure if Linda saw my result note to her.  Please let her know that the lumpy area appears to be scar tissue, likely from radiation.  I would recommend gentle massage to this area daily for several months to help break up the scar tissue.  She does not need to follow-up in oncology clinic further.  We can offer her survivorship clinic at  if she wishes!    Thank you,  JH \"      She would like to do survivorship follow-up at the .    Conchis Clark RN    "

## 2024-08-28 NOTE — TELEPHONE ENCOUNTER
"Linda was seen in clinic on 6/20/24. Reporting \"persistent right chest wall pain, increasing right lower medial skin hyperpigmentation, and aging silicone implants, will further evaluate with breast MRI\".    MRI done on 7/25/24 with report of the following    \"1. Intact implants.  2. Mild enhancement of the skin of the right lower inner breast,  probably sequela of radiation therapy.  3. No specific finding to explain ongoing right chest wall pain\"    Pt calling today for results and to report continued right chest wall pain and a lump. More painful than when she was seen in June.    Routed to Dr Geoff Clark RN    "

## 2024-08-29 NOTE — PROGRESS NOTES
New Patient Oncology Nurse Navigator Note     Referring provider: Dr. Steffi Tellez     Referring Clinic/Organization: [facility] [place]     Referred to (specialty:) Survivorship     Requested provider (if applicable): Anya Nunes PA-C     Date Referral Received: August 28, 2024     Evaluation for:  C50.311, Z17.0 (ICD-10-CM) - Malignant neoplasm of lower-inner quadrant of right breast of female, estrogen receptor positive (H)      Writer received referral, reviewed for appropriate plan, and referral transferred to New Patient Scheduling for completion.

## 2024-09-05 ENCOUNTER — MYC REFILL (OUTPATIENT)
Dept: FAMILY MEDICINE | Facility: CLINIC | Age: 54
End: 2024-09-05
Payer: COMMERCIAL

## 2024-09-06 NOTE — TELEPHONE ENCOUNTER
Pt previously filled Omeprazole under former PCP     Omeprazole     Dispensed Days Supply Quantity Provider Pharmacy   OMEPRAZOLE 20 MG CAPSULE  05/28/2024 90 90 capsule Cassie Oneal MD [facility]...   OMEPRAZOLE 20 MG CAPSULE  02/23/2024 90 90 capsule Cassie Oneal MD [facility]...   OMEPRAZOLE 20 MG CAPSULE  11/16/2023 90 90 capsule Cassie Oneal MD [facility]...

## 2024-09-06 NOTE — TELEPHONE ENCOUNTER
Clinic RN: Please contact patient because the medication is listed as historical or discontinued. Confirm patient is taking this medication. Document findings and route refill encounter to provider for approval or denial.    Uziel Ashby, RN, BSN, MSN  RN Lead

## 2024-09-10 DIAGNOSIS — R12 HEARTBURN: Primary | ICD-10-CM

## 2024-10-30 DIAGNOSIS — M54.2 NECK PAIN: Primary | ICD-10-CM

## 2024-10-31 RX ORDER — SUMATRIPTAN SUCCINATE 100 MG/1
TABLET ORAL
Qty: 9 TABLET | Refills: 3 | Status: SHIPPED | OUTPATIENT
Start: 2024-10-31

## 2024-11-18 ENCOUNTER — TELEPHONE (OUTPATIENT)
Dept: GASTROENTEROLOGY | Facility: CLINIC | Age: 54
End: 2024-11-18
Payer: COMMERCIAL

## 2024-11-18 DIAGNOSIS — Z12.11 ENCOUNTER FOR SCREENING COLONOSCOPY: Primary | ICD-10-CM

## 2024-11-18 NOTE — TELEPHONE ENCOUNTER
"Endoscopy Scheduling Screen    Have you had any respiratory illness or flu-like symptoms in the last 10 days?  No    What is your communication preference for Instructions and/or Bowel Prep?   MyChart    What insurance is in the chart?  Other:      Ordering/Referring Provider:   ZACK HAYNES        (If ordering provider performs procedure, schedule with ordering provider unless otherwise instructed. )    BMI: Estimated body mass index is 40.22 kg/m  as calculated from the following:    Height as of 7/12/24: 1.51 m (4' 11.45\").    Weight as of 7/12/24: 91.7 kg (202 lb 3.2 oz).     Sedation Ordered  moderate sedation.   If patient BMI > 50 do not schedule in [facility].    If patient BMI > 45 do not schedule at [facility].    Are you taking methadone or Suboxone?  NO, No RN review required.    Have you been diagnosed and are being treated for severe PTSD or severe anxiety?  NO, No RN review required.    Are you taking any prescription medications for pain 3 or more times per week?   NO, No RN review required.    Do you have a history of malignant hyperthermia?  No    (Females) Are you currently pregnant?   No     Have you been diagnosed or told you have pulmonary hypertension?   No    Do you have an LVAD?  No    Have you been told you have moderate to severe sleep apnea?  No.    Have you been told you have COPD, asthma, or any other lung disease?  NoN    Do you have any heart conditions?  No     Have you ever had or are you waiting for an organ transplant?  No. Continue scheduling, no site restrictions.    Have you had a stroke or transient ischemic attack (TIA aka \"mini stroke\" in the last 6 months?   No    Have you been diagnosed with or been told you have cirrhosis of the liver?   No.    Are you currently on dialysis?   No    Do you need assistance transferring?   No    BMI: Estimated body mass index is 40.22 kg/m  as calculated from the following:    Height as of 7/12/24: 1.51 m (4' 11.45\").    Weight as of 7/12/24: " 91.7 kg (202 lb 3.2 oz).     Is patients BMI > 40 and scheduling location [facility]?  No    Do you take an injectable or oral medication for weight loss or diabetes (excluding insulin)?  No    Do you take the medication Naltrexone?  No    Do you take blood thinners?  No       Prep   Are you currently on dialysis or do you have chronic kidney disease?  No    Do you have a diagnosis of diabetes?  No    Do you have a diagnosis of cystic fibrosis (CF)?  No    On a regular basis do you go 3 -5 days between bowel movements?  No    BMI > 40?  No    Preferred Pharmacy:    [facility] [place]  [place], [place] - [address] [address]  [address] [address]  Phone: [phone] Fax: [phone]      Final Scheduling Details     Procedure scheduled  Colonoscopy    Surgeon:  KRISSY     Date of procedure:  12/05/2024     Pre-OP / PAC:   No - Not required for this site.    Location  [facility] - Patient preference.    Sedation   Moderate Sedation - Per order.      Patient Reminders:   You will receive a call from a Nurse to review instructions and health history.  This assessment must be completed prior to your procedure.  Failure to complete the Nurse assessment may result in the procedure being cancelled.      On the day of your procedure, please designate an adult(s) who can drive you home stay with you for the next 24 hours. The medicines used in the exam will make you sleepy. You will not be able to drive.      You cannot take public transportation, ride share services, or non-medical taxi service without a responsible caregiver.  Medical transport services are allowed with the requirement that a responsible caregiver will receive you at your destination.  We require that drivers and caregivers are confirmed prior to your procedure.

## 2024-11-20 ENCOUNTER — MYC MEDICAL ADVICE (OUTPATIENT)
Dept: GASTROENTEROLOGY | Facility: CLINIC | Age: 54
End: 2024-11-20
Payer: COMMERCIAL

## 2024-11-20 RX ORDER — BISACODYL 5 MG/1
TABLET, DELAYED RELEASE ORAL
Qty: 4 TABLET | Refills: 0 | Status: SHIPPED | OUTPATIENT
Start: 2024-11-20

## 2024-11-20 NOTE — TELEPHONE ENCOUNTER
Extended Golytely Bowel Prep  recommended due to BMI > 40.  Instructions were sent via [employer]. Bowel prep was sent 11/20/2024 to    [facility] - [address] [facility].       Nikia Blakely LPN  Endoscopy Procedure Pre Assessment

## 2024-11-25 ENCOUNTER — TELEPHONE (OUTPATIENT)
Dept: GASTROENTEROLOGY | Facility: CLINIC | Age: 54
End: 2024-11-25
Payer: COMMERCIAL

## 2024-11-25 NOTE — TELEPHONE ENCOUNTER
Pre visit planning completed.      Procedure details:    Patient scheduled for Colonoscopy on 12/5/2024.     Arrival time: 1430. Procedure time 1515    Facility location: [facility]; [address] [place] [address]. Check in location: Main entrance, door #1 on the North side of the building under roundabout awning. DO NOT GO TO SURGERY/ED ENTRANCE.     Sedation type: Conscious sedation     Pre op exam needed? No.    Indication for procedure: Screening       Chart review:     Electronic implanted devices? No    Recent diagnosis of diverticulitis within the last 6 weeks? No      Medication review:    Diabetic? No    Anticoagulants? No    Weight loss medication/injectable? No GLP-1 medication per patient's medication list. Nursing to verify with pre-assessment call.    Other medication HOLDING recommendations:  N/A      Prep for procedure:     Bowel prep recommendation: Standard Golytely. Bowel prep prescription sent by LPN to    [facility], [place] - [address] [address]  Due to: BMI > 40.     Prep instructions sent via [employer]         Dinora Ribera RN  Endoscopy Procedure Pre Assessment   [phone] option 2

## 2024-11-25 NOTE — TELEPHONE ENCOUNTER
Caller:     Reason for Reschedule/Cancellation   (please be detailed, any staff messages or encounters to note?): NO       Prior to reschedule please review:  Ordering Provider:     ZACK HAYNES     Sedation Determined: CS  Does patient have any ASC Exclusions, please identify?:       Notes on Cancelled Procedure:  Procedure: Lower Endoscopy [Colonoscopy]   Date: 12/5  Location: [facility]; [facility] [address] [place] [address]  Surgeon: KRISSY      Rescheduled: Yes,   Procedure: Lower Endoscopy [Colonoscopy]    Date: 12/19   Location: [facility]; [facility] [address] [place] [address]   Surgeon: KRISSY   Sedation Level Scheduled  CS ,  Reason for Sedation Level ORDER   Instructions updated and sent: Y     Does patient need PAC or Pre -Op Rescheduled? : N       Did you cancel or rescheduled an EUS procedure? No.

## 2024-11-25 NOTE — TELEPHONE ENCOUNTER
Patient needs to reschedule for January or February 2025 as they do not have a  for 12.5.2024.    Patient transferred to endoscopy scheduling line.    Cheryl Karimi RN

## 2024-12-02 ENCOUNTER — MYC MEDICAL ADVICE (OUTPATIENT)
Dept: GASTROENTEROLOGY | Facility: CLINIC | Age: 54
End: 2024-12-02
Payer: COMMERCIAL

## 2024-12-02 NOTE — TELEPHONE ENCOUNTER
Attempted to contact patient in order to complete pre assessment questions.     No answer. Left message to return call to [phone] option 2    Pre-op needed? No.    Callback communication sent via [employer].    Linda Hanna RN  Endoscopy Procedure Pre Assessment

## 2024-12-02 NOTE — TELEPHONE ENCOUNTER
ADD ON    RESCHEDULED PROCEDURE   Reason:No , and then pt wanted SH    Pre visit planning completed.      Procedure details:    Patient scheduled for Colonoscopy on 12-13-24.     Arrival time: 1215. Procedure time 1300    Facility location: [facility]; [address] [place] [place] [address]. Check in location: [address] [facility].     Sedation type: Conscious sedation     Pre op exam needed? No.    Indication for procedure: screening      Chart review:     Electronic implanted devices? No    Recent diagnosis of diverticulitis within the last 6 weeks? No      Medication review:    Diabetic? No    Anticoagulants? No    Weight loss medication/injectable? No.    Other medication HOLDING recommendations:  N/A      Prep for procedure:     Bowel prep recommendation: Extended Golytely. Bowel prep prescription sent to      [facility] - [address] [facility]   Due to: BMI > 40.     Prep instructions sent via [employer]         Linda Hanna RN  Endoscopy Procedure Pre Assessment RN  [phone] option 2

## 2024-12-04 NOTE — TELEPHONE ENCOUNTER
Pre assessment completed for upcoming procedure.   (Please see previous telephone encounter notes for complete details)      Procedure details:    Arrival time and facility location reviewed.    Pre op exam needed? No.    Designated  policy reviewed. Instructed to have someone stay 6 hours post procedure.       Medication review:    Medications reviewed. Please see supporting documentation below. Holding recommendations discussed (if applicable).   Ferrous Sulfate (iron supplement): HOLD 7 days before procedure. (Multivitamin)      Prep for procedure:     Procedure prep instructions reviewed.        Any additional information needed:  N/A      Patient verbalized understanding and had no questions or concerns at this time.      Ana Laura Russ RN  Endoscopy Procedure Pre Assessment   [phone] option 2

## 2024-12-13 ENCOUNTER — HOSPITAL ENCOUNTER (OUTPATIENT)
Facility: CLINIC | Age: 54
Discharge: HOME | End: 2024-12-13
Attending: COLON & RECTAL SURGERY | Admitting: COLON & RECTAL SURGERY
Payer: COMMERCIAL

## 2024-12-13 VITALS
DIASTOLIC BLOOD PRESSURE: 65 MMHG | HEART RATE: 74 BPM | SYSTOLIC BLOOD PRESSURE: 112 MMHG | RESPIRATION RATE: 15 BRPM | OXYGEN SATURATION: 96 %

## 2024-12-13 LAB — COLONOSCOPY: NORMAL

## 2024-12-13 PROCEDURE — G0500 MOD SEDAT ENDO SERVICE >5YRS: HCPCS | Performed by: COLON & RECTAL SURGERY

## 2024-12-13 PROCEDURE — G0121 COLON CA SCRN NOT HI RSK IND: HCPCS | Performed by: COLON & RECTAL SURGERY

## 2024-12-13 PROCEDURE — 250N000011 HC RX IP 250 OP 636: Performed by: COLON & RECTAL SURGERY

## 2024-12-13 PROCEDURE — 45378 DIAGNOSTIC COLONOSCOPY: CPT | Performed by: COLON & RECTAL SURGERY

## 2024-12-13 RX ORDER — FENTANYL CITRATE 50 UG/ML
INJECTION, SOLUTION INTRAMUSCULAR; INTRAVENOUS PRN
Status: DISCONTINUED | OUTPATIENT
Start: 2024-12-13 | End: 2024-12-13 | Stop reason: HOSPADM

## 2024-12-13 ASSESSMENT — ACTIVITIES OF DAILY LIVING (ADL): ADLS_ACUITY_SCORE: 41

## 2024-12-13 NOTE — H&P
Pre-Endoscopy History and Physical     Linda Selby MRN# [id number]   YOB: 1970 Age: 54 year old     Date of Procedure: 2024  Primary care provider: Trung Santamaria  Type of Endoscopy: Colonoscopy  Reason for Procedure: Screening  Type of Anesthesia Anticipated: Moderate Sedation    HPI:    Linda is a 54 year old female who will be undergoing the above procedure.      A history and physical has been performed. The patient's medications and allergies have been reviewed. The risks and benefits of the procedure and the sedation options and risks were discussed with the patient.  All questions were answered and informed consent was obtained.      She denies a personal or family history of anesthesia complications or bleeding disorders.     Allergies   Allergen Reactions    Hydromorphone      Altered heart rate    Oxycodone Hcl Nausea        No current facility-administered medications for this encounter.       Patient Active Problem List   Diagnosis    Breast cancer (H)    Pulmonary embolism, bilateral (H)    Morbid obesity (H)        Past Medical History:   Diagnosis Date    Breast cancer (H)     right, had mastectomy and xrt, left prophylactic mastectomy, implants, tamoxifen for 4 years, stopped due to pe    Gastro-oesophageal reflux disease     Migraines     PE (pulmonary thromboembolism) (H)     and dvt, felt to be due to tamoxifen use        Past Surgical History:   Procedure Laterality Date    CARPAL TUNNEL RELEASE RT/LT      CHOLECYSTECTOMY      DILATION AND CURETTAGE, OPERATIVE HYSTEROSCOPY WITH MORCELLATOR, COMBINED N/A 2017    Procedure: COMBINED DILATION AND CURETTAGE, OPERATIVE HYSTEROSCOPY WITH MORCELLATOR;  DILATION AND CURETTAGE, OPERATIVE HYSTEROSCOPY WITH MYOSURE MORCELLATOR ;  Surgeon: Tonie Gomez MD;  Location: [facility]    GYN SURGERY          GYN SURGERY      cervical surgery    INSERT TISSUE EXPANDER BREAST  2012     "Procedure: INSERT TISSUE EXPANDER BREAST;  Right Tissue Expander Placement with Alloderm;  Surgeon: Cain Bolden MD;  Location:  OR    INSERT TISSUE EXPANDER BREAST Left 2015    Procedure: INSERT TISSUE EXPANDER BREAST;  Surgeon: Cain Bolden MD;  [facility]    MASTECTOMY SIMPLE Left 2015    Procedure: MASTECTOMY SIMPLE;  Surgeon: Sanjuana Lyman MD;  [facility]    MASTECTOMY SIMPLE BILATERAL, SENTINEL NODE BILATERAL, COMBINED  2012    Procedure: COMBINED MASTECTOMY SIMPLE BILATERAL, SENTINEL NODE BILATERAL;  RIGHT MASTECTOMY WITH RIGHT SENTINEL LYMPH NODE BIOPSY(HARMONIC SCALPEL, NEOPROBE) (DR LYMAN), RIGHT TISSUE EXPANDER WITH ALLODERM (DR MYERS;  Surgeon: Sanjuana Lyman MD;  Location:  OR       Social History     Tobacco Use    Smoking status: Former     Current packs/day: 0.00     Types: Cigarettes     Quit date: 2012     Years since quittin.2    Smokeless tobacco: Former    Tobacco comments:     rare smoker   Substance Use Topics    Alcohol use: No     Comment: 1/month       Family History   Problem Relation Age of Onset    Alcoholism Father     Parkinsonism Father        REVIEW OF SYSTEMS:     5 point ROS negative except as noted above in HPI, including Gen., Resp., CV, GI &  system review.      PHYSICAL EXAM:   [facility] 10/13/2017  Estimated body mass index is 40.22 kg/m  as calculated from the following:    Height as of 24: 1.51 m (4' 11.45\").    Weight as of 24: 91.7 kg (202 lb 3.2 oz).   GENERAL APPEARANCE: healthy and alert  MENTAL STATUS: alert  AIRWAY EXAM: Mallampatti Class II (visualization of the soft palate, fauces, and uvula)  RESP: lungs clear to auscultation - no rales, rhonchi or wheezes  CV: regular rates and rhythm      IMPRESSION   ASA Class 3 - Severe systemic disease, but not incapacitating        PLAN:     Plan for colonoscopy. We discussed the risks, benefits and alternatives and the patient wished to " proceed.    The above has been forwarded to the consulting provider.      Ash Strauss MD  [facility]  Phone: [phone]  Fax: [phone]  December 13, 2024

## 2025-06-12 ENCOUNTER — PATIENT OUTREACH (OUTPATIENT)
Dept: CARE COORDINATION | Facility: CLINIC | Age: 55
End: 2025-06-12
Payer: COMMERCIAL

## 2025-07-15 ENCOUNTER — APPOINTMENT (OUTPATIENT)
Dept: GENERAL RADIOLOGY | Facility: CLINIC | Age: 55
End: 2025-07-15
Attending: EMERGENCY MEDICINE

## 2025-07-15 ENCOUNTER — APPOINTMENT (OUTPATIENT)
Dept: CT IMAGING | Facility: CLINIC | Age: 55
End: 2025-07-15
Attending: EMERGENCY MEDICINE

## 2025-07-15 ENCOUNTER — HOSPITAL ENCOUNTER (EMERGENCY)
Facility: CLINIC | Age: 55
Discharge: HOME | End: 2025-07-15
Attending: EMERGENCY MEDICINE

## 2025-07-15 VITALS
HEART RATE: 53 BPM | SYSTOLIC BLOOD PRESSURE: 114 MMHG | OXYGEN SATURATION: 97 % | RESPIRATION RATE: 15 BRPM | TEMPERATURE: 96.9 F | DIASTOLIC BLOOD PRESSURE: 71 MMHG

## 2025-07-15 DIAGNOSIS — M47.22 SPONDYLOSIS OF CERVICAL SPINE WITH RADICULOPATHY: ICD-10-CM

## 2025-07-15 DIAGNOSIS — M54.12 CERVICAL RADICULOPATHY: ICD-10-CM

## 2025-07-15 DIAGNOSIS — M54.2 CERVICALGIA: ICD-10-CM

## 2025-07-15 LAB
ALBUMIN SERPL BCG-MCNC: 4.1 G/DL (ref 3.5–5.2)
ALP SERPL-CCNC: 99 U/L (ref 40–150)
ALT SERPL W P-5'-P-CCNC: 39 U/L (ref 0–50)
ANION GAP SERPL CALCULATED.3IONS-SCNC: 13 MMOL/L (ref 7–15)
AST SERPL W P-5'-P-CCNC: 32 U/L (ref 0–45)
ATRIAL RATE - MUSE: 56 BPM
BASOPHILS # BLD AUTO: 0 10E3/UL (ref 0–0.2)
BASOPHILS NFR BLD AUTO: 0 %
BILIRUB SERPL-MCNC: 0.5 MG/DL
BUN SERPL-MCNC: 11.6 MG/DL (ref 6–20)
CALCIUM SERPL-MCNC: 9.2 MG/DL (ref 8.8–10.4)
CHLORIDE SERPL-SCNC: 104 MMOL/L (ref 98–107)
CREAT SERPL-MCNC: 0.54 MG/DL (ref 0.51–0.95)
DIASTOLIC BLOOD PRESSURE - MUSE: NORMAL MMHG
EGFRCR SERPLBLD CKD-EPI 2021: >90 ML/MIN/1.73M2
EOSINOPHIL # BLD AUTO: 0.2 10E3/UL (ref 0–0.7)
EOSINOPHIL NFR BLD AUTO: 4 %
ERYTHROCYTE [DISTWIDTH] IN BLOOD BY AUTOMATED COUNT: 12.2 % (ref 10–15)
GLUCOSE SERPL-MCNC: 117 MG/DL (ref 70–99)
HCO3 SERPL-SCNC: 21 MMOL/L (ref 22–29)
HCT VFR BLD AUTO: 34.5 % (ref 35–47)
HGB BLD-MCNC: 11.9 G/DL (ref 11.7–15.7)
IMM GRANULOCYTES # BLD: 0 10E3/UL
IMM GRANULOCYTES NFR BLD: 0 %
INTERPRETATION ECG - MUSE: NORMAL
LYMPHOCYTES # BLD AUTO: 2 10E3/UL (ref 0.8–5.3)
LYMPHOCYTES NFR BLD AUTO: 38 %
MCH RBC QN AUTO: 28.3 PG (ref 26.5–33)
MCHC RBC AUTO-ENTMCNC: 34.5 G/DL (ref 31.5–36.5)
MCV RBC AUTO: 82 FL (ref 78–100)
MONOCYTES # BLD AUTO: 0.3 10E3/UL (ref 0–1.3)
MONOCYTES NFR BLD AUTO: 5 %
NEUTROPHILS # BLD AUTO: 2.9 10E3/UL (ref 1.6–8.3)
NEUTROPHILS NFR BLD AUTO: 53 %
NRBC # BLD AUTO: 0 10E3/UL
NRBC BLD AUTO-RTO: 0 /100
P AXIS - MUSE: 36 DEGREES
PLATELET # BLD AUTO: 255 10E3/UL (ref 150–450)
POTASSIUM SERPL-SCNC: 3.6 MMOL/L (ref 3.4–5.3)
PR INTERVAL - MUSE: 176 MS
PROT SERPL-MCNC: 7.2 G/DL (ref 6.4–8.3)
QRS DURATION - MUSE: 86 MS
QT - MUSE: 450 MS
QTC - MUSE: 434 MS
R AXIS - MUSE: -3 DEGREES
RBC # BLD AUTO: 4.21 10E6/UL (ref 3.8–5.2)
SODIUM SERPL-SCNC: 138 MMOL/L (ref 135–145)
SYSTOLIC BLOOD PRESSURE - MUSE: NORMAL MMHG
T AXIS - MUSE: 56 DEGREES
TROPONIN T SERPL HS-MCNC: <6 NG/L
TROPONIN T SERPL HS-MCNC: <6 NG/L
VENTRICULAR RATE- MUSE: 56 BPM
WBC # BLD AUTO: 5.4 10E3/UL (ref 4–11)

## 2025-07-15 PROCEDURE — 84484 ASSAY OF TROPONIN QUANT: CPT | Performed by: EMERGENCY MEDICINE

## 2025-07-15 PROCEDURE — 82947 ASSAY GLUCOSE BLOOD QUANT: CPT | Performed by: EMERGENCY MEDICINE

## 2025-07-15 PROCEDURE — 72125 CT NECK SPINE W/O DYE: CPT

## 2025-07-15 PROCEDURE — 71046 X-RAY EXAM CHEST 2 VIEWS: CPT

## 2025-07-15 PROCEDURE — 99285 EMERGENCY DEPT VISIT HI MDM: CPT | Mod: 25 | Performed by: EMERGENCY MEDICINE

## 2025-07-15 PROCEDURE — 80053 COMPREHEN METABOLIC PANEL: CPT | Performed by: EMERGENCY MEDICINE

## 2025-07-15 PROCEDURE — 36415 COLL VENOUS BLD VENIPUNCTURE: CPT | Performed by: EMERGENCY MEDICINE

## 2025-07-15 PROCEDURE — 85004 AUTOMATED DIFF WBC COUNT: CPT | Performed by: EMERGENCY MEDICINE

## 2025-07-15 RX ORDER — CYCLOBENZAPRINE HCL 10 MG
10 TABLET ORAL 3 TIMES DAILY PRN
Qty: 18 TABLET | Refills: 0 | Status: SHIPPED | OUTPATIENT
Start: 2025-07-15 | End: 2025-07-21

## 2025-07-15 RX ORDER — METHYLPREDNISOLONE 4 MG/1
TABLET ORAL
Qty: 21 TABLET | Refills: 0 | Status: SHIPPED | OUTPATIENT
Start: 2025-07-15

## 2025-07-15 ASSESSMENT — ACTIVITIES OF DAILY LIVING (ADL)
ADLS_ACUITY_SCORE: 41

## 2025-07-15 ASSESSMENT — COLUMBIA-SUICIDE SEVERITY RATING SCALE - C-SSRS
1. IN THE PAST MONTH, HAVE YOU WISHED YOU WERE DEAD OR WISHED YOU COULD GO TO SLEEP AND NOT WAKE UP?: NO
2. HAVE YOU ACTUALLY HAD ANY THOUGHTS OF KILLING YOURSELF IN THE PAST MONTH?: NO
6. HAVE YOU EVER DONE ANYTHING, STARTED TO DO ANYTHING, OR PREPARED TO DO ANYTHING TO END YOUR LIFE?: NO

## 2025-07-15 NOTE — ED PROVIDER NOTES
Emergency Department Note      History of Present Illness     Chief Complaint   Chest Pain      HPI   Linda Selby is a 55 year old female with history of PE, breast cancer, migraines and carpal tunnel syndrome who presents to the ED for evaluation of chest pain. The patient reports that she had the onset of left arm pain in the evening 2 days ago, and took Tylenol which provided relief. Last night she states that she had the onset of similar left arm pain at around 2100, took 2 tylenol at 2230 and tried to go to bed, but was unsuccessful. She reports that every time she would try to sleep, she would wake up shortly after feeling shortness of breath and increased pain. She notes that the pain in her left arm  travels into her left wrist and hand, and is localized to the dorsum of her hand. She adds that she was experiencing numbness/tingling sensations in her 1st-3rd digits. Deep aching pain in forearm/wrist. Notes that she has a history of carpal tunnel and is unsure if this pain is is the same. She endorses chronic left neck pain/tension and adds that she has a history of heavy lifting at her work. She adds that she has never had any imagining of her neck. She denies any chest pain. She reports that her pain stopped around 1 hour prior to evaluation. No recent travel, surgeries or injuries//trauma. She reports that she has a history of blood clots associated with a surgical procedure, but does not take any blood thinners.     Independent Historian   None    Review of External Notes   H&P from 12/13/2024    Past Medical History     Medical History and Problem List   Breast cancer   GERD   Migraines   Reflux esophagitis   PE   PONV   Sleep apnea   Obesity   Carpal tunnel syndrome     Medications   Dulcolax   Prilosec   Flexeril   Deltasone   Golytely   Imitrex     Surgical History   Carpal tunnel release   Cholecystectomy   Colonoscopy   Esophagogastroduodenoscopy   Dilation and curettage, hysterectomy with  morcellator    section   Cervical surgery   Insert tissue expander - breast   Mastectomy  Mastectomy, sentinel node bilateral - combined     Physical Exam     Patient Vitals for the past 24 hrs:   BP Temp Pulse Resp SpO2   07/15/25 0836 -- -- 53 15 97 %   07/15/25 0830 114/71 -- (!) 48 -- 97 %   07/15/25 0800 119/72 -- (!) 49 11 100 %   07/15/25 0739 (!) 121/91 -- 56 -- 98 %   07/15/25 0725 -- -- 55 15 97 %   07/15/25 0700 (!) 138/93 -- (!) 48 16 98 %   07/15/25 0421 137/84 96.9  F (36.1  C) 64 18 100 %     Physical Exam  General: Patient is alert and normal appearing.  HEENT: Head atraumatic    Eyes: pupils equal and reactive. Conjunctiva clear   Nares: patent   Oropharynx: no lesions, uvula midline, no palatal draping, normal voice, no trismus  Neck: Supple without lymphadenopathy, no meningismus  Chest: Heart regular rate and rhythm.   Lungs: Equal clear to auscultation with no wheeze or rales  Abdomen: Soft, non tender, nondistended, normal bowel sounds  Back: No costovertebral angle tenderness, midline and left paraspinal cervical tenderness to palpation with tenderness over the trapezius on the left.  No midline , T or L spine tenderness  Neuro: Grossly nonfocal, normal speech, strength equal bilaterally, CN 2-12 intact.  Bilateral upper extremity  strength and biceps muscular prescription strength intact.  Extremities: No deformities, equal radial and DP pulses. No clubbing, cyanosis.  No edema  Skin: Warm and dry with no rash.       Diagnostics     Lab Results   Labs Ordered and Resulted from Time of ED Arrival to Time of ED Departure   COMPREHENSIVE METABOLIC PANEL - Abnormal       Result Value    Sodium 138      Potassium 3.6      Carbon Dioxide (CO2) 21 (*)     Anion Gap 13      Urea Nitrogen 11.6      Creatinine 0.54      GFR Estimate >90      Calcium 9.2      Chloride 104      Glucose 117 (*)     Alkaline Phosphatase 99      AST 32      ALT 39      Protein Total 7.2      Albumin 4.1       Bilirubin Total 0.5     CBC WITH PLATELETS AND DIFFERENTIAL - Abnormal    WBC Count 5.4      RBC Count 4.21      Hemoglobin 11.9      Hematocrit 34.5 (*)     MCV 82      MCH 28.3      MCHC 34.5      RDW 12.2      Platelet Count 255      % Neutrophils 53      % Lymphocytes 38      % Monocytes 5      % Eosinophils 4      % Basophils 0      % Immature Granulocytes 0      NRBCs per 100 WBC 0      Absolute Neutrophils 2.9      Absolute Lymphocytes 2.0      Absolute Monocytes 0.3      Absolute Eosinophils 0.2      Absolute Basophils 0.0      Absolute Immature Granulocytes 0.0      Absolute NRBCs 0.0     TROPONIN T, HIGH SENSITIVITY - Normal    Troponin T, High Sensitivity <6     TROPONIN T, HIGH SENSITIVITY - Normal    Troponin T, High Sensitivity <6         Imaging   CT Cervical Spine w/o Contrast   Final Result   IMPRESSION:   1.  Multilevel cervical spondylosis, greatest at C5-C6 with moderate canal stenosis.   2.  No high-grade neural foraminal narrowing.                  XR Chest 2 Views   Final Result   IMPRESSION: Negative chest.          EKG   ECG taken at 0440, ECG read at 0636  Sinus bradycardia    Rate 56 bpm. AK interval 176 ms. QRS duration 86 ms. QT/QTc 450/434 ms. P-R-T axes 36 -3 56.    Independent Interpretation   X-ray chest shows no acute infiltrate or pneumothorax    ED Course      Medications Administered   Medications - No data to display    Procedures   Procedures     Discussion of Management   None    ED Course   ED Course as of 07/15/25 0939   Tue Jul 15, 2025   0636 I obtained history and examined the patient as noted above.    0810 I rechecked and updated the patient.        Additional Documentation  None    Medical Decision Making / Diagnosis     CMS Diagnoses: None    MIPS   None               [facility]   Linda Selby is a 55 year old female who presents for evaluation of left arm pain with some tingling to her left hand.  She also complains of neck and trapezius pain on the left that is been  intermittent.  Usually this improves with Tylenol but did not improve tonight.  She became concerned that this could be an anginal equivalent therefore presents to the emergency department.  Chest x-ray without acute infiltrate.  EKG without ischemic changes and troponins undetectable.  There is  radiculopathy. Clinical examination is consistent with muscle spasm.  Suspect likely pinched nerve or herniated disc based on her history.  No evidence for anginal equivalent or acute coronary syndrome.    I doubt fracture, ligamentous instability, myelopathy, dissection, spinal tumor or abscess at this time.  Given midline pain as well as the radicular symptoms and no previous imaging CT scan was obtained.  There is evidence of spondylosis at C5-6 and I suspect likely radiculopathy associated with this.. I discussed worrisome symptoms/signs, if they were to evolve, that should prompt the patient to follow up more quickly or return to the ED.  There are no red flag symptoms to suggest we need further workup or MRI at this point.   Supportive outpatient management is indicated.     Discussed radiculopathy, causes and treatments.  Discussed that this does not mean they necessarily need an MRI but they do need close f/u of primary.  Decided on pain control and muscle spasm control.      Disposition   The patient was discharged.     Diagnosis     ICD-10-CM    1. Cervicalgia  M54.2       2. Cervical radiculopathy  M54.12       3. Spondylosis of cervical spine with radiculopathy  M47.22            Discharge Medications   Discharge Medication List as of 7/15/2025  8:33 AM        START taking these medications    Details   cyclobenzaprine (FLEXERIL) 10 MG tablet Take 1 tablet (10 mg) by mouth 3 times daily as needed for muscle spasms., Disp-18 tablet, R-0, Local Print      methylPREDNISolone (MEDROL DOSEPAK) 4 MG tablet therapy pack Follow Package Directions, Disp-21 tablet, R-0, Local Print               Scribe Disclosure:  I,  Arcenio Michaels, am serving as a scribe at 6:37 AM on 7/15/2025 to document services personally performed by Linda Langston MD based on my observations and the provider's statements to me.        Linda Langston MD  07/15/25 0949

## 2025-07-15 NOTE — DISCHARGE INSTRUCTIONS
Discharge Instructions  Neck Strain    You have been seen today for a neck sprain or strain.  Neck strains usually result from an injury to the neck. Car accidents, contact sports, and falls are common causes of neck strain. Sometimes your neck can start to hurt because of increased activity, muscle tension, an abnormal sleeping position, or because of other problems like arthritis in the neck.     Neck pain usually comes from injured muscles and ligaments. Sometimes there is a herniated ( slipped ) disc. We do not usually do MRI scans to look for these right away, since most herniated discs will get better on their own with time. Today, we did not find any evidence that your neck pain was caused by a serious or dangerous condition. However, sometimes symptoms develop over time and cannot be found during an emergency visit, so it is very important that you follow up with your primary provider.    Generally, every Emergency Department visit should have a follow-up clinic visit with either a primary or a specialty clinic/provider. Please follow-up as instructed by your emergency provider today.    Return to the Emergency Department if:  You have increasing pain in your neck.  You develop difficulty swallowing or breathing.  You have numbness, weakness, or trouble moving your arms or legs.  You have severe dizziness and difficulty walking.  You are unable to control your bladder or bowels.  You develop severe headache or ringing in the ears.    What can I do to help myself at home?  If you had an injury, use cold for the first 1-2 days. Cold helps relieve pain and reduce inflammation.  Apply ice packs to the neck or areas of pain every 1-2 hours for 20 minutes at a time. Place a towel or cloth between your skin and the ice pack.  After the first 2 days, using heat can help with neck pain and stiffness. You may use a warm shower or bath, warm towels on the neck, or a heating pad. Do not sleep with a heating pad, as you  can be burned.   Pain medications - You may take a pain medication such as Tylenol  (acetaminophen), Advil  and Motrin  (ibuprofen), or Aleve  (naproxen).  It is usually best to rest the neck for 1-2 days after an injury, then start gentle stretching exercises.   It is helpful to place a small pillow under the nape of your neck to provide proper neutral positioning.   You should stay active and do your usual work as much as you can, unless this involves heavy physical labor. Ask your provider if you need work restrictions.  If you were given a prescription for medicine here today, be sure to read all of the information (including the package insert) that comes with your prescription.  This will include important information about the medicine, its side effects, and any warnings that you need to know about.  The pharmacist who fills the prescription can provide more information and answer questions you may have about the medicine.  If you have questions or concerns that the pharmacist cannot address, please call or return to the Emergency Department.   Remember that you can always come back to the Emergency Department if you are not able to see your regular provider in the amount of time listed above, if you get any new symptoms, or if there is anything that worries you.

## 2025-07-15 NOTE — ED TRIAGE NOTES
Patient arrives with chest pain. She reports she can't sleep due to it. Pain in left shoulder and radiates down arm. Feels like she needs to keep forcing herself to take a deep breath.      Triage Assessment (Adult)       Row Name 07/15/25 0437          Triage Assessment    Airway WDL WDL        Respiratory WDL    Respiratory WDL WDL        Skin Circulation/Temperature WDL    Skin Circulation/Temperature WDL WDL        Cardiac WDL    Cardiac WDL X        Peripheral/Neurovascular WDL    Peripheral Neurovascular WDL WDL        Cognitive/Neuro/Behavioral WDL    Cognitive/Neuro/Behavioral WDL WDL

## 2025-08-24 ENCOUNTER — HEALTH MAINTENANCE LETTER (OUTPATIENT)
Age: 55
End: 2025-08-24

## (undated) DEVICE — LINEN TOWEL PACK X5 5464

## (undated) DEVICE — SOL NACL 0.9% INJ 1000ML BAG 2B1324X

## (undated) DEVICE — DRAPE MAYO STAND 23X54 8337

## (undated) DEVICE — SUCTION CANISTER MEDIVAC LINER 3000ML W/LID 65651-530

## (undated) DEVICE — CURETTE SUCTION PIPELLE ENDOMETRIAL 3.1MMX23.5CM  8200

## (undated) DEVICE — GOWN XXLG REINFORCED 9071EL

## (undated) DEVICE — SUCTION CANISTER BEMIS HI FLOW 006772-901

## (undated) DEVICE — GLOVE PROTEXIS W/NEU-THERA 7.5  2D73TE75

## (undated) DEVICE — PACK TVT HYSTEROSCOPY SMA15HYFSE

## (undated) DEVICE — DILATOR PROBE UTERINE OS CANAL FINDER 260-610

## (undated) DEVICE — SOL NACL 0.9% IRRIG 1000ML BOTTLE 07138-09

## (undated) RX ORDER — PROPOFOL 10 MG/ML
INJECTION, EMULSION INTRAVENOUS
Status: DISPENSED
Start: 2017-11-17

## (undated) RX ORDER — FENTANYL CITRATE 50 UG/ML
INJECTION, SOLUTION INTRAMUSCULAR; INTRAVENOUS
Status: DISPENSED
Start: 2024-12-13

## (undated) RX ORDER — FENTANYL CITRATE 50 UG/ML
INJECTION, SOLUTION INTRAMUSCULAR; INTRAVENOUS
Status: DISPENSED
Start: 2017-11-17